# Patient Record
Sex: FEMALE | Race: OTHER | HISPANIC OR LATINO | URBAN - METROPOLITAN AREA
[De-identification: names, ages, dates, MRNs, and addresses within clinical notes are randomized per-mention and may not be internally consistent; named-entity substitution may affect disease eponyms.]

---

## 2017-10-27 ENCOUNTER — INPATIENT (INPATIENT)
Facility: HOSPITAL | Age: 77
LOS: 2 days | Discharge: HOME | DRG: 311 | End: 2017-10-30
Attending: INTERNAL MEDICINE | Admitting: INTERNAL MEDICINE
Payer: COMMERCIAL

## 2017-10-27 VITALS
DIASTOLIC BLOOD PRESSURE: 77 MMHG | SYSTOLIC BLOOD PRESSURE: 166 MMHG | OXYGEN SATURATION: 97 % | HEART RATE: 69 BPM | WEIGHT: 173.06 LBS | HEIGHT: 60 IN | RESPIRATION RATE: 18 BRPM | TEMPERATURE: 98 F

## 2017-10-27 DIAGNOSIS — I24.9 ACUTE ISCHEMIC HEART DISEASE, UNSPECIFIED: ICD-10-CM

## 2017-10-27 LAB
ALBUMIN SERPL ELPH-MCNC: 3.8 G/DL — SIGNIFICANT CHANGE UP (ref 3.5–5)
ALP SERPL-CCNC: 80 U/L — SIGNIFICANT CHANGE UP (ref 40–120)
ALT FLD-CCNC: 24 U/L DA — SIGNIFICANT CHANGE UP (ref 10–60)
ANION GAP SERPL CALC-SCNC: 5 MMOL/L — SIGNIFICANT CHANGE UP (ref 5–17)
AST SERPL-CCNC: 16 U/L — SIGNIFICANT CHANGE UP (ref 10–40)
BASOPHILS # BLD AUTO: 0.1 K/UL — SIGNIFICANT CHANGE UP (ref 0–0.2)
BASOPHILS NFR BLD AUTO: 1.1 % — SIGNIFICANT CHANGE UP (ref 0–2)
BILIRUB SERPL-MCNC: 0.8 MG/DL — SIGNIFICANT CHANGE UP (ref 0.2–1.2)
BUN SERPL-MCNC: 16 MG/DL — SIGNIFICANT CHANGE UP (ref 7–18)
CALCIUM SERPL-MCNC: 9 MG/DL — SIGNIFICANT CHANGE UP (ref 8.4–10.5)
CHLORIDE SERPL-SCNC: 105 MMOL/L — SIGNIFICANT CHANGE UP (ref 96–108)
CK SERPL-CCNC: 101 U/L — SIGNIFICANT CHANGE UP (ref 21–215)
CO2 SERPL-SCNC: 28 MMOL/L — SIGNIFICANT CHANGE UP (ref 22–31)
CREAT SERPL-MCNC: 0.59 MG/DL — SIGNIFICANT CHANGE UP (ref 0.5–1.3)
EOSINOPHIL # BLD AUTO: 0.1 K/UL — SIGNIFICANT CHANGE UP (ref 0–0.5)
EOSINOPHIL NFR BLD AUTO: 1.6 % — SIGNIFICANT CHANGE UP (ref 0–6)
GLUCOSE SERPL-MCNC: 99 MG/DL — SIGNIFICANT CHANGE UP (ref 70–99)
HCT VFR BLD CALC: 42.6 % — SIGNIFICANT CHANGE UP (ref 34.5–45)
HGB BLD-MCNC: 14.5 G/DL — SIGNIFICANT CHANGE UP (ref 11.5–15.5)
LYMPHOCYTES # BLD AUTO: 2.8 K/UL — SIGNIFICANT CHANGE UP (ref 1–3.3)
LYMPHOCYTES # BLD AUTO: 35.1 % — SIGNIFICANT CHANGE UP (ref 13–44)
MCHC RBC-ENTMCNC: 30.6 PG — SIGNIFICANT CHANGE UP (ref 27–34)
MCHC RBC-ENTMCNC: 34 GM/DL — SIGNIFICANT CHANGE UP (ref 32–36)
MCV RBC AUTO: 90 FL — SIGNIFICANT CHANGE UP (ref 80–100)
MONOCYTES # BLD AUTO: 0.6 K/UL — SIGNIFICANT CHANGE UP (ref 0–0.9)
MONOCYTES NFR BLD AUTO: 7.1 % — SIGNIFICANT CHANGE UP (ref 2–14)
NEUTROPHILS # BLD AUTO: 4.4 K/UL — SIGNIFICANT CHANGE UP (ref 1.8–7.4)
NEUTROPHILS NFR BLD AUTO: 55.1 % — SIGNIFICANT CHANGE UP (ref 43–77)
PLATELET # BLD AUTO: 225 K/UL — SIGNIFICANT CHANGE UP (ref 150–400)
POTASSIUM SERPL-MCNC: 4.3 MMOL/L — SIGNIFICANT CHANGE UP (ref 3.5–5.3)
POTASSIUM SERPL-SCNC: 4.3 MMOL/L — SIGNIFICANT CHANGE UP (ref 3.5–5.3)
PROT SERPL-MCNC: 7.9 G/DL — SIGNIFICANT CHANGE UP (ref 6–8.3)
RBC # BLD: 4.74 M/UL — SIGNIFICANT CHANGE UP (ref 3.8–5.2)
RBC # FLD: 12.3 % — SIGNIFICANT CHANGE UP (ref 10.3–14.5)
SODIUM SERPL-SCNC: 138 MMOL/L — SIGNIFICANT CHANGE UP (ref 135–145)
TROPONIN I SERPL-MCNC: <0.015 NG/ML — SIGNIFICANT CHANGE UP (ref 0–0.04)
WBC # BLD: 8 K/UL — SIGNIFICANT CHANGE UP (ref 3.8–10.5)
WBC # FLD AUTO: 8 K/UL — SIGNIFICANT CHANGE UP (ref 3.8–10.5)

## 2017-10-27 PROCEDURE — 71275 CT ANGIOGRAPHY CHEST: CPT | Mod: 26

## 2017-10-27 PROCEDURE — 74174 CTA ABD&PLVS W/CONTRAST: CPT | Mod: 26

## 2017-10-27 PROCEDURE — 99285 EMERGENCY DEPT VISIT HI MDM: CPT

## 2017-10-27 RX ORDER — ASPIRIN/CALCIUM CARB/MAGNESIUM 324 MG
325 TABLET ORAL ONCE
Qty: 0 | Refills: 0 | Status: COMPLETED | OUTPATIENT
Start: 2017-10-27 | End: 2017-10-28

## 2017-10-27 RX ORDER — NITROGLYCERIN 6.5 MG
1 CAPSULE, EXTENDED RELEASE ORAL ONCE
Qty: 0 | Refills: 0 | Status: COMPLETED | OUTPATIENT
Start: 2017-10-27 | End: 2017-10-28

## 2017-10-27 NOTE — ED ADULT NURSE NOTE - ED STAT RN HANDOFF DETAILS 2
pt.remained  stable.. denies  pain. on  tele box O with NSR.denies  chest pain. transfer to   [address] report  given  to kan aldana.not  in  distress

## 2017-10-27 NOTE — ED PROVIDER NOTE - MEDICAL DECISION MAKING DETAILS
pt comes in with back pain and chest pain and shoulder pain intermittent   no n/v/d no cough referred for eval ekg lateral flipped t waves

## 2017-10-27 NOTE — ED PROVIDER NOTE - OBJECTIVE STATEMENT
77 y/o F pt with PMHx of HTN and no significant PSHx presents to ED c/o intermittent back pain radiating to b/l arms and neck x2 weeks. Pt was referred for admission by PMD after obtaining an abnormal EKG today. Pt denies fever, chills, or any other complaints in ED. NKDA.

## 2017-10-28 DIAGNOSIS — Z29.9 ENCOUNTER FOR PROPHYLACTIC MEASURES, UNSPECIFIED: ICD-10-CM

## 2017-10-28 DIAGNOSIS — K21.9 GASTRO-ESOPHAGEAL REFLUX DISEASE WITHOUT ESOPHAGITIS: ICD-10-CM

## 2017-10-28 DIAGNOSIS — E03.9 HYPOTHYROIDISM, UNSPECIFIED: ICD-10-CM

## 2017-10-28 DIAGNOSIS — I24.9 ACUTE ISCHEMIC HEART DISEASE, UNSPECIFIED: ICD-10-CM

## 2017-10-28 DIAGNOSIS — E78.5 HYPERLIPIDEMIA, UNSPECIFIED: ICD-10-CM

## 2017-10-28 LAB
ALBUMIN SERPL ELPH-MCNC: 3.5 G/DL — SIGNIFICANT CHANGE UP (ref 3.5–5)
ALP SERPL-CCNC: 73 U/L — SIGNIFICANT CHANGE UP (ref 40–120)
ALT FLD-CCNC: 21 U/L DA — SIGNIFICANT CHANGE UP (ref 10–60)
ANION GAP SERPL CALC-SCNC: 5 MMOL/L — SIGNIFICANT CHANGE UP (ref 5–17)
AST SERPL-CCNC: 14 U/L — SIGNIFICANT CHANGE UP (ref 10–40)
BILIRUB DIRECT SERPL-MCNC: 0.2 MG/DL — SIGNIFICANT CHANGE UP (ref 0–0.2)
BILIRUB INDIRECT FLD-MCNC: 0.9 MG/DL — SIGNIFICANT CHANGE UP (ref 0.2–1)
BILIRUB SERPL-MCNC: 1.1 MG/DL — SIGNIFICANT CHANGE UP (ref 0.2–1.2)
BUN SERPL-MCNC: 13 MG/DL — SIGNIFICANT CHANGE UP (ref 7–18)
CALCIUM SERPL-MCNC: 8.7 MG/DL — SIGNIFICANT CHANGE UP (ref 8.4–10.5)
CHLORIDE SERPL-SCNC: 105 MMOL/L — SIGNIFICANT CHANGE UP (ref 96–108)
CHOLEST SERPL-MCNC: 243 MG/DL — HIGH (ref 10–199)
CK MB BLD-MCNC: <1.2 % — SIGNIFICANT CHANGE UP (ref 0–3.5)
CK MB BLD-MCNC: <1.4 % — SIGNIFICANT CHANGE UP (ref 0–3.5)
CK MB CFR SERPL CALC: <1 NG/ML — SIGNIFICANT CHANGE UP (ref 0–3.6)
CK MB CFR SERPL CALC: <1 NG/ML — SIGNIFICANT CHANGE UP (ref 0–3.6)
CK SERPL-CCNC: 74 U/L — SIGNIFICANT CHANGE UP (ref 21–215)
CK SERPL-CCNC: 81 U/L — SIGNIFICANT CHANGE UP (ref 21–215)
CO2 SERPL-SCNC: 28 MMOL/L — SIGNIFICANT CHANGE UP (ref 22–31)
CREAT SERPL-MCNC: 0.51 MG/DL — SIGNIFICANT CHANGE UP (ref 0.5–1.3)
FOLATE SERPL-MCNC: >20 NG/ML — SIGNIFICANT CHANGE UP (ref 4.8–24.2)
GLUCOSE SERPL-MCNC: 95 MG/DL — SIGNIFICANT CHANGE UP (ref 70–99)
HBA1C BLD-MCNC: 5.5 % — SIGNIFICANT CHANGE UP (ref 4–5.6)
HCT VFR BLD CALC: 41.5 % — SIGNIFICANT CHANGE UP (ref 34.5–45)
HDLC SERPL-MCNC: 59 MG/DL — SIGNIFICANT CHANGE UP (ref 40–125)
HGB BLD-MCNC: 13.8 G/DL — SIGNIFICANT CHANGE UP (ref 11.5–15.5)
LIPID PNL WITH DIRECT LDL SERPL: 154 MG/DL — SIGNIFICANT CHANGE UP
MAGNESIUM SERPL-MCNC: 2.3 MG/DL — SIGNIFICANT CHANGE UP (ref 1.6–2.6)
MCHC RBC-ENTMCNC: 30.4 PG — SIGNIFICANT CHANGE UP (ref 27–34)
MCHC RBC-ENTMCNC: 33.4 GM/DL — SIGNIFICANT CHANGE UP (ref 32–36)
MCV RBC AUTO: 91 FL — SIGNIFICANT CHANGE UP (ref 80–100)
NT-PROBNP SERPL-SCNC: 196 PG/ML — SIGNIFICANT CHANGE UP (ref 0–450)
PHOSPHATE SERPL-MCNC: 3.4 MG/DL — SIGNIFICANT CHANGE UP (ref 2.5–4.5)
PLATELET # BLD AUTO: 201 K/UL — SIGNIFICANT CHANGE UP (ref 150–400)
POTASSIUM SERPL-MCNC: 3.9 MMOL/L — SIGNIFICANT CHANGE UP (ref 3.5–5.3)
POTASSIUM SERPL-SCNC: 3.9 MMOL/L — SIGNIFICANT CHANGE UP (ref 3.5–5.3)
PROT SERPL-MCNC: 7.3 G/DL — SIGNIFICANT CHANGE UP (ref 6–8.3)
RBC # BLD: 4.56 M/UL — SIGNIFICANT CHANGE UP (ref 3.8–5.2)
RBC # FLD: 12.5 % — SIGNIFICANT CHANGE UP (ref 10.3–14.5)
SODIUM SERPL-SCNC: 138 MMOL/L — SIGNIFICANT CHANGE UP (ref 135–145)
TOTAL CHOLESTEROL/HDL RATIO MEASUREMENT: 4.1 RATIO — SIGNIFICANT CHANGE UP (ref 3.3–7.1)
TRIGL SERPL-MCNC: 151 MG/DL — HIGH (ref 10–149)
TROPONIN I SERPL-MCNC: <0.015 NG/ML — SIGNIFICANT CHANGE UP (ref 0–0.04)
TROPONIN I SERPL-MCNC: <0.015 NG/ML — SIGNIFICANT CHANGE UP (ref 0–0.04)
TSH SERPL-MCNC: 14.9 UU/ML — HIGH (ref 0.34–4.82)
VIT B12 SERPL-MCNC: 357 PG/ML — SIGNIFICANT CHANGE UP (ref 243–894)
WBC # BLD: 6.1 K/UL — SIGNIFICANT CHANGE UP (ref 3.8–10.5)
WBC # FLD AUTO: 6.1 K/UL — SIGNIFICANT CHANGE UP (ref 3.8–10.5)

## 2017-10-28 RX ORDER — PANTOPRAZOLE SODIUM 20 MG/1
40 TABLET, DELAYED RELEASE ORAL
Qty: 0 | Refills: 0 | Status: DISCONTINUED | OUTPATIENT
Start: 2017-10-28 | End: 2017-10-30

## 2017-10-28 RX ORDER — ACETAMINOPHEN 500 MG
650 TABLET ORAL EVERY 6 HOURS
Qty: 0 | Refills: 0 | Status: DISCONTINUED | OUTPATIENT
Start: 2017-10-28 | End: 2017-10-30

## 2017-10-28 RX ORDER — ASPIRIN/CALCIUM CARB/MAGNESIUM 324 MG
81 TABLET ORAL DAILY
Qty: 0 | Refills: 0 | Status: DISCONTINUED | OUTPATIENT
Start: 2017-10-28 | End: 2017-10-30

## 2017-10-28 RX ORDER — METOPROLOL TARTRATE 50 MG
12.5 TABLET ORAL
Qty: 0 | Refills: 0 | Status: DISCONTINUED | OUTPATIENT
Start: 2017-10-28 | End: 2017-10-30

## 2017-10-28 RX ORDER — ENOXAPARIN SODIUM 100 MG/ML
80 INJECTION SUBCUTANEOUS ONCE
Qty: 0 | Refills: 0 | Status: COMPLETED | OUTPATIENT
Start: 2017-10-28 | End: 2017-10-28

## 2017-10-28 RX ORDER — NITROGLYCERIN 6.5 MG
0.3 CAPSULE, EXTENDED RELEASE ORAL
Qty: 0 | Refills: 0 | Status: DISCONTINUED | OUTPATIENT
Start: 2017-10-28 | End: 2017-10-30

## 2017-10-28 RX ORDER — INFLUENZA VIRUS VACCINE 15; 15; 15; 15 UG/.5ML; UG/.5ML; UG/.5ML; UG/.5ML
0.5 SUSPENSION INTRAMUSCULAR ONCE
Qty: 0 | Refills: 0 | Status: COMPLETED | OUTPATIENT
Start: 2017-10-28 | End: 2017-10-28

## 2017-10-28 RX ORDER — LEVOTHYROXINE SODIUM 125 MCG
75 TABLET ORAL DAILY
Qty: 0 | Refills: 0 | Status: DISCONTINUED | OUTPATIENT
Start: 2017-10-28 | End: 2017-10-30

## 2017-10-28 RX ORDER — ATORVASTATIN CALCIUM 80 MG/1
40 TABLET, FILM COATED ORAL AT BEDTIME
Qty: 0 | Refills: 0 | Status: DISCONTINUED | OUTPATIENT
Start: 2017-10-28 | End: 2017-10-30

## 2017-10-28 RX ADMIN — ENOXAPARIN SODIUM 80 MILLIGRAM(S): 100 INJECTION SUBCUTANEOUS at 03:30

## 2017-10-28 RX ADMIN — PANTOPRAZOLE SODIUM 40 MILLIGRAM(S): 20 TABLET, DELAYED RELEASE ORAL at 06:30

## 2017-10-28 RX ADMIN — Medication 1 INCH(S): at 00:37

## 2017-10-28 RX ADMIN — Medication 325 MILLIGRAM(S): at 00:37

## 2017-10-28 RX ADMIN — Medication 650 MILLIGRAM(S): at 12:38

## 2017-10-28 RX ADMIN — Medication 81 MILLIGRAM(S): at 12:38

## 2017-10-28 RX ADMIN — Medication 1 INCH(S): at 12:37

## 2017-10-28 RX ADMIN — Medication 75 MICROGRAM(S): at 06:30

## 2017-10-28 RX ADMIN — ATORVASTATIN CALCIUM 40 MILLIGRAM(S): 80 TABLET, FILM COATED ORAL at 00:36

## 2017-10-28 RX ADMIN — Medication 650 MILLIGRAM(S): at 13:30

## 2017-10-28 RX ADMIN — Medication 12.5 MILLIGRAM(S): at 06:30

## 2017-10-28 RX ADMIN — ATORVASTATIN CALCIUM 40 MILLIGRAM(S): 80 TABLET, FILM COATED ORAL at 22:54

## 2017-10-28 NOTE — CONSULT NOTE ADULT - SUBJECTIVE AND OBJECTIVE BOX
Requesting Physician : Dr. Gallegos     Reason for Consultation: Chest pain     HISTORY OF PRESENT ILLNESS:  77 yo F with history of HLD, GERD, hypothyroidism who is being evaluated for chest pain.  The patient was admitted with intermittent chest pain for two weeks.  The patient reports chest pain radiating to back and occasionally the left shoulder that occurs intermittently and at rest.  The patient denies exertional component to symptoms but does reports occasional fatigue with walking.  She reports cough and URI symptoms during this time as well.  No orthopnea, le edema, pnd, syncope or any other cardiac complaints.  Upon evaluation, the patient was chest pain free.       PAST MEDICAL & SURGICAL HISTORY: HTN, HLD, GERD           MEDICATIONS:  MEDICATIONS  (STANDING):  aspirin enteric coated 81 milliGRAM(s) Oral daily  atorvastatin 40 milliGRAM(s) Oral at bedtime  influenza   Vaccine 0.5 milliLiter(s) IntraMuscular once  levothyroxine 75 MICROGram(s) Oral daily  metoprolol     tartrate 12.5 milliGRAM(s) Oral two times a day  pantoprazole    Tablet 40 milliGRAM(s) Oral before breakfast      Allergies    No Known Allergies    Intolerances        FAMILY HISTORY:    Non-contributary for premature coronary disease or sudden cardiac death    SOCIAL HISTORY:    [x ] Non-smoker  [ ] Smoker  [ ] Alcohol      REVIEW OF SYSTEMS:  [x ]chest pain  [  ]shortness of breath  [  ]palpitations  [  ]syncope  [ ]near syncope [ ]upper extremity weakness   [ ] lower extremity weakness  [  ]diplopia  [  ]altered mental status   [  ]fevers  [ ]chills [ ]nausea  [ ]vomitting  [  ]dysphagia    [ ]abdominal pain  [ ]melena  [ ]BRBPR    [  ]epistaxis  [  ]rash    [ ]lower extremity edema        [x ] All others negative	  [ ] Unable to obtain    PHYSICAL EXAM:  T(C): 36.6 (10-28-17 @ 08:01), Max: 36.9 (10-27-17 @ 23:33)  HR: 54 (10-28-17 @ 08:01) (54 - 69)  BP: 100/48 (10-28-17 @ 08:01) (100/48 - 166/77)  RR: 18 (10-28-17 @ 08:01) (18 - 20)  SpO2: 95% (10-28-17 @ 08:01) (95% - 97%)  Wt(kg): --  I&O's Summary          Lymphatic: No lymphadenopathy , no edema  Cardiovascular: Normal S1 S2, No JVD, No murmurs , Peripheral pulses palpable 2+ bilaterally  Respiratory: Lungs clear to auscultation, normal effort 	  Gastrointestinal:  Soft, Non-tender, + BS	  Skin: No rashes, No ecchymoses, No cyanosis, warm to touch  Musculoskeletal: Normal range of motion, normal strength  Psychiatry:  Mood & affect appropriate      TELEMETRY: SR	    ECG: SR, non-specific T wave abnormalities  	  RADIOLOGY:  OTHER:     DIAGNOSTIC TESTING:  [ ] Echocardiogram:  [ ]  Catheterization:  [ ] Stress Test:    	  	  LABS:	 	    CARDIAC MARKERS:  CARDIAC MARKERS ( 28 Oct 2017 04:41 )  <0.015 ng/mL / x     / 81 U/L / x     / <1.0 ng/mL  CARDIAC MARKERS ( 27 Oct 2017 21:06 )  <0.015 ng/mL / x     / 101 U/L / x     / x                                  13.8   6.1   )-----------( 201      ( 28 Oct 2017 04:41 )             41.5     10-28    138  |  105  |  13  ----------------------------<  95  3.9   |  28  |  0.51    Ca    8.7      28 Oct 2017 04:41  Phos  3.4     10-28  Mg     2.3     10-28    TPro  7.3  /  Alb  3.5  /  TBili  1.1  /  DBili  0.2  /  AST  14  /  ALT  21  /  AlkPhos  73  10-28    proBNP: Serum Pro-Brain Natriuretic Peptide: 196 pg/mL (10-27 @ 21:06)    Lipid Profile:   HgA1c: Hemoglobin A1C, Whole Blood: 5.5 % (10-28 @ 09:18)    TSH: Thyroid Stimulating Hormone, Serum: 14.90 uU/mL (10-28 @ 04:41)      ASSESSMENT/PLAN: 	76yFemale with HLD, GERD, hypothyroidism admitted with chest pain.   -MI ruled out  -CT negative for dissection  -Check TTE  -Check NST  -further workup pending above    Annie Caldera MD  [facility]  [address], [id number]  [place] [address]  office: [phone]  pager: [phone]

## 2017-10-28 NOTE — H&P ADULT - NSHPPHYSICALEXAM_GEN_ALL_CORE
GENERAL: middle aged lady in no acute distress  HEAD: atraumatic, normocephalic   EYES: PERRLA, white sclera.   ENMT: nasal mucosa- Oral cavity- moist  NECK: supple, no JVD, thyroid- not palpable  LYMPH: no palpable lymph nodes     SKIN:  warm and dry  CHEST/LUNG: No Chest deformity , no chest tenderness. bilateral breath sounds,no  adventitious sounds  HEART: RRR, no m/r/g   ABDOMEN: soft, nontender, nondistended; bowel sounds+  EXTREMITIES: mild b/l Ankle puffiness, no cyanosis, no clubbing.  NEURO: AA0X3     No ROM limitation of the left arm  Back pain on deep palpation in the left upper-middle back

## 2017-10-28 NOTE — H&P ADULT - HISTORY OF PRESENT ILLNESS
Patient is a 75 y/o F pt from home, with PMHx of HLD, GERD and hypothyroidism,  and no significant PSHx presents to ED c/o intermittent back pain radiating to left arm and neck for 2 weeks.  patient describes the Back pain as achy, no recent unusual exercise or back trauma. intermittent, non exertional and radiates to the left arm. No CP, but feels SOB on climbing stairs. denies orthopnea but states that she has been sleeping in sitting position in night for the past week or so, no peripheral edema, No PND. Also admits to cough productive of white sputum only since today. ROS negative except above. Pt was referred for admission by PMD after obtaining an abnormal EKG today.  NKDA. Used to smoker 30 years ago, never drinker, FHx of CAD in mother, who dies of MI and in both brothers. No Surgical Hx

## 2017-10-28 NOTE — H&P ADULT - PROBLEM SELECTOR PLAN 1
Patient with atypical presentation, but has T W I in lateral leads, currently chest pain free and females may have an atypical presentation  TWI in Lateral leads in EKG  MAX score:1  will manage as per ACS protocol, ASA, Statin, BB, TELE, trend Troponins, ECHO  Will give 1 full dose lovenox and get cardio Eval Dr Cárdenas Patient with atypical presentation, but has T W I in lateral leads, currently chest pain free and females may have an atypical presentation  TWI in Lateral leads in EKG  MAX score:1  will manage as per ACS protocol, ASA, Statin, BB, TELE, trend Troponins, ECHO  Will give 1 full dose lovenox and get cardio Eval Dr Lares

## 2017-10-28 NOTE — PROGRESS NOTE ADULT - SUBJECTIVE AND OBJECTIVE BOX
Patient is a 76y old  Female who presents with a chief complaint of Back pain (28 Oct 2017 00:49)    pt seen in icu [  ], reg med floor [   ], bed [  ], chair at bedside [   ], a+o x3 [  ], lethargic [  ],  nad [  ]    vann [  ], ngt [  ], peg [  ], et tube [  ], cent line [  ], picc line [  ]        Allergies    No Known Allergies        Vitals    T(F): 97.9 (10-28-17 @ 08:01), Max: 98.4 (10-27-17 @ 23:33)  HR: 54 (10-28-17 @ 08:01) (54 - 69)  BP: 100/48 (10-28-17 @ 08:01) (100/48 - 166/77)  RR: 18 (10-28-17 @ 08:01) (18 - 20)  SpO2: 95% (10-28-17 @ 08:01) (95% - 97%)  Wt(kg): --  CAPILLARY BLOOD GLUCOSE          Labs                          13.8   6.1   )-----------( 201      ( 28 Oct 2017 04:41 )             41.5       10-28    138  |  105  |  13  ----------------------------<  95  3.9   |  28  |  0.51    Ca    8.7      28 Oct 2017 04:41  Phos  3.4     10-28  Mg     2.3     10-28    TPro  7.3  /  Alb  3.5  /  TBili  1.1  /  DBili  0.2  /  AST  14  /  ALT  21  /  AlkPhos  73  10-28      CARDIAC MARKERS ( 28 Oct 2017 04:41 )  <0.015 ng/mL / x     / 81 U/L / x     / <1.0 ng/mL  CARDIAC MARKERS ( 27 Oct 2017 21:06 )  <0.015 ng/mL / x     / 101 U/L / x     / x                Radiology Results      Meds    MEDICATIONS  (STANDING):  aspirin enteric coated 81 milliGRAM(s) Oral daily  atorvastatin 40 milliGRAM(s) Oral at bedtime  influenza   Vaccine 0.5 milliLiter(s) IntraMuscular once  levothyroxine 75 MICROGram(s) Oral daily  metoprolol     tartrate 12.5 milliGRAM(s) Oral two times a day  pantoprazole    Tablet 40 milliGRAM(s) Oral before breakfast      MEDICATIONS  (PRN):  guaiFENesin    Syrup 100 milliGRAM(s) Oral every 6 hours PRN Cough  nitroglycerin     SubLingual 0.3 milliGRAM(s) SubLingual every 5 minutes PRN Chest Pain      Physical Exam    Neuro :  no focal deficits  Respiratory: CTA B/L  CV: RRR, S1S2, no murmurs,   Abdominal: Soft, NT, ND +BS,  Extremities: No edema, + peripheral pulses    ASSESSMENT    Acute ischemic heart disease      PLAN Patient is a 77 y/o F pt from home, with PMHx of HLD, GERD and hypothyroidism,  and no significant PSHx presents to ED c/o intermittent back pain radiating to left arm and neck for 2 weeks.  patient describes the Back pain as achy, no recent unusual exercise or back trauma. intermittent, non exertional and radiates to the left arm. No CP, but feels SOB on climbing stairs. denies orthopnea but states that she has been sleeping in sitting position in night for the past week or so, no peripheral edema, No PND. Also admits to cough productive of white sputum only since today. ROS negative except above. Pt was referred for admission by PMD after obtaining an abnormal EKG today.  NKDA. Used to smoker 30 years ago, never drinker, FHx of CAD in mother, who dies of MI and in both brothers. No Surgical Hx    Review of Systems:  Other Review of Systems: All other review of systems negative, except as noted in HPI	      pt seen in tele [x  ], reg med floor [   ], bed [ x ], chair at bedside [   ], a+o x3 [ x ], lethargic [  ],  nad [ x ]      Allergies    No Known Allergies        Vitals    T(F): 97.9 (10-28-17 @ 08:01), Max: 98.4 (10-27-17 @ 23:33)  HR: 54 (10-28-17 @ 08:01) (54 - 69)  BP: 100/48 (10-28-17 @ 08:01) (100/48 - 166/77)  RR: 18 (10-28-17 @ 08:01) (18 - 20)  SpO2: 95% (10-28-17 @ 08:01) (95% - 97%)  Wt(kg): --  CAPILLARY BLOOD GLUCOSE          Labs                          13.8   6.1   )-----------( 201      ( 28 Oct 2017 04:41 )             41.5       10-28    138  |  105  |  13  ----------------------------<  95  3.9   |  28  |  0.51    Ca    8.7      28 Oct 2017 04:41  Phos  3.4     10-28  Mg     2.3     10-28    TPro  7.3  /  Alb  3.5  /  TBili  1.1  /  DBili  0.2  /  AST  14  /  ALT  21  /  AlkPhos  73  10-28      CARDIAC MARKERS ( 28 Oct 2017 04:41 )  <0.015 ng/mL / x     / 81 U/L / x     / <1.0 ng/mL  CARDIAC MARKERS ( 27 Oct 2017 21:06 )  <0.015 ng/mL / x     / 101 U/L / x     / x                Radiology Results    < from: CT Angio Chest w/ IV Cont (10.27.17 @ 22:43) >  FINDINGS:  There is motion artifact present which degrades image quality.    The aorta is normal in caliber without evidence of dissection or   aneurysm. There is no contrast extravasation. No intramural hematoma is   present on the precontrast images. The great vessels off the aortic arch   are patent. The celiac, superior mesenteric, inferior mesenteric and both   renal arteries are patent. Both common iliac arteries are patent and   normal in caliber.    There is no evidence of a pulmonary embolism.    No significant mediastinal, axillary or hilar lymphadenopathy is   identified.    Evaluation of the lung parenchyma demonstrates no suspicious pulmonary   nodule or mass. There is no pneumonia. Small cyst is seen in the lingula   of the left upper lobe. There is no pleural effusion or pneumothorax. The   trachea and main bronchi are clear.    The heart size is within normal limits. No pericardial effusion is seen.    The liver, gallbladder, spleen, pancreas, adrenal glands and kidneys   demonstrate no abnormality for phase of enhancement.    There is no bowel obstruction. There is diverticulosis without   diverticulitis. A normal appendix is seen.    Mildly enlarged likely fibroid uterus. No adnexal masses. No urinary   bladder wall thickening. There is mild grade 1 anterior listhesis of L4   on L5, likely on a degenerative basis. There are no acute osseous   abnormalities.    There is a small asymmetric 8mm right breast nodule.    IMPRESSION: No evidence of aortic dissection or aneurysm. Patent   mesenteric vasculature.    8mm asymmetric right breast nodule. Correlation with mammography is   recommended.    < end of copied text >        Meds    MEDICATIONS  (STANDING):  aspirin enteric coated 81 milliGRAM(s) Oral daily  atorvastatin 40 milliGRAM(s) Oral at bedtime  influenza   Vaccine 0.5 milliLiter(s) IntraMuscular once  levothyroxine 75 MICROGram(s) Oral daily  metoprolol     tartrate 12.5 milliGRAM(s) Oral two times a day  pantoprazole    Tablet 40 milliGRAM(s) Oral before breakfast      MEDICATIONS  (PRN):  guaiFENesin    Syrup 100 milliGRAM(s) Oral every 6 hours PRN Cough  nitroglycerin     SubLingual 0.3 milliGRAM(s) SubLingual every 5 minutes PRN Chest Pain      Physical Exam    Neuro :  no focal deficits  Respiratory: CTA B/L  CV: RRR, S1S2, no murmurs,   Abdominal: Soft, NT, ND +BS,  Extremities: No edema, + peripheral pulses    ASSESSMENT    chest pain  r/o Acute ischemic heart disease  right breast nodule  generalized back and shoulder pain  h/o HLD,   GERD   hypothyroidism    PLAN    adm to tele,   acs protocol,   ce q8 x2 neg  f/u echo  cardio cons  cta with breast nodule but neg for aortic aneurysm or pe noted above  surg eval  f/u diagnostic miguel right breast  f/u esr, gui, ck, rf  cont pain control  pain mgmt eval  cont current meds

## 2017-10-28 NOTE — H&P ADULT - ASSESSMENT
Patient is a 77 y/o F pt from home, with PMHx of HLD, GERD and hypothyroidism,  and no significant PSHx presents to ED c/o intermittent back pain radiating to left arm and neck for 2 weeks.     Patient has stable labs and vitals in the ED, s/p 1 Full dos of ASA. Got CTA Chest and Abdomen in the ED, showed no evidence of dissection or aneurysm. 8mm right breast nodule, recommended Mammogram but patient says she had mammogram 1 year ago that was WNL.

## 2017-10-29 LAB
ANION GAP SERPL CALC-SCNC: 6 MMOL/L — SIGNIFICANT CHANGE UP (ref 5–17)
BUN SERPL-MCNC: 13 MG/DL — SIGNIFICANT CHANGE UP (ref 7–18)
CALCIUM SERPL-MCNC: 8.8 MG/DL — SIGNIFICANT CHANGE UP (ref 8.4–10.5)
CHLORIDE SERPL-SCNC: 105 MMOL/L — SIGNIFICANT CHANGE UP (ref 96–108)
CO2 SERPL-SCNC: 28 MMOL/L — SIGNIFICANT CHANGE UP (ref 22–31)
CREAT SERPL-MCNC: 0.5 MG/DL — SIGNIFICANT CHANGE UP (ref 0.5–1.3)
GLUCOSE SERPL-MCNC: 95 MG/DL — SIGNIFICANT CHANGE UP (ref 70–99)
HCT VFR BLD CALC: 44 % — SIGNIFICANT CHANGE UP (ref 34.5–45)
HGB BLD-MCNC: 14.5 G/DL — SIGNIFICANT CHANGE UP (ref 11.5–15.5)
MAGNESIUM SERPL-MCNC: 2.4 MG/DL — SIGNIFICANT CHANGE UP (ref 1.6–2.6)
MCHC RBC-ENTMCNC: 30.2 PG — SIGNIFICANT CHANGE UP (ref 27–34)
MCHC RBC-ENTMCNC: 33 GM/DL — SIGNIFICANT CHANGE UP (ref 32–36)
MCV RBC AUTO: 91.7 FL — SIGNIFICANT CHANGE UP (ref 80–100)
PHOSPHATE SERPL-MCNC: 3.2 MG/DL — SIGNIFICANT CHANGE UP (ref 2.5–4.5)
PLATELET # BLD AUTO: 196 K/UL — SIGNIFICANT CHANGE UP (ref 150–400)
POTASSIUM SERPL-MCNC: 4 MMOL/L — SIGNIFICANT CHANGE UP (ref 3.5–5.3)
POTASSIUM SERPL-SCNC: 4 MMOL/L — SIGNIFICANT CHANGE UP (ref 3.5–5.3)
RBC # BLD: 4.8 M/UL — SIGNIFICANT CHANGE UP (ref 3.8–5.2)
RBC # FLD: 12.6 % — SIGNIFICANT CHANGE UP (ref 10.3–14.5)
SODIUM SERPL-SCNC: 139 MMOL/L — SIGNIFICANT CHANGE UP (ref 135–145)
T4 FREE SERPL-MCNC: 1.2 NG/DL — SIGNIFICANT CHANGE UP (ref 0.9–1.8)
WBC # BLD: 6 K/UL — SIGNIFICANT CHANGE UP (ref 3.8–10.5)
WBC # FLD AUTO: 6 K/UL — SIGNIFICANT CHANGE UP (ref 3.8–10.5)

## 2017-10-29 RX ADMIN — Medication 81 MILLIGRAM(S): at 11:56

## 2017-10-29 RX ADMIN — PANTOPRAZOLE SODIUM 40 MILLIGRAM(S): 20 TABLET, DELAYED RELEASE ORAL at 06:50

## 2017-10-29 RX ADMIN — Medication 75 MICROGRAM(S): at 06:50

## 2017-10-29 RX ADMIN — ATORVASTATIN CALCIUM 40 MILLIGRAM(S): 80 TABLET, FILM COATED ORAL at 21:17

## 2017-10-29 NOTE — PROGRESS NOTE ADULT - SUBJECTIVE AND OBJECTIVE BOX
PGY 1 Note discussed with supervising resident and primary attending    Patient is a 76y old  Female who presents with a chief complaint of Back pain (28 Oct 2017 00:49)      INTERVAL HPI/OVERNIGHT EVENTS: Patient was seen and examined at bed side. No new complaints.     MEDICATIONS  (STANDING):  aspirin enteric coated 81 milliGRAM(s) Oral daily  atorvastatin 40 milliGRAM(s) Oral at bedtime  influenza   Vaccine 0.5 milliLiter(s) IntraMuscular once  levothyroxine 75 MICROGram(s) Oral daily  metoprolol     tartrate 12.5 milliGRAM(s) Oral two times a day  pantoprazole    Tablet 40 milliGRAM(s) Oral before breakfast    MEDICATIONS  (PRN):  acetaminophen   Tablet. 650 milliGRAM(s) Oral every 6 hours PRN Moderate Pain (4 - 6)  guaiFENesin    Syrup 100 milliGRAM(s) Oral every 6 hours PRN Cough  nitroglycerin     SubLingual 0.3 milliGRAM(s) SubLingual every 5 minutes PRN Chest Pain      __________________________________________________  REVIEW OF SYSTEMS:    CONSTITUTIONAL: No fever,   EYES: no acute visual disturbances  NECK: No pain or stiffness  RESPIRATORY: No cough; No shortness of breath  CARDIOVASCULAR: No chest pain, no palpitations  GASTROINTESTINAL: No pain. No nausea or vomiting; No diarrhea   NEUROLOGICAL: No headache or numbness, no tremors  MUSCULOSKELETAL:  pain of left upper back  GENITOURINARY: no dysuria, no frequency, no hesitancy  PSYCHIATRY: no depression , no anxiety  ALL OTHER  ROS negative        Vital Signs Last 24 Hrs  T(C): 36.7 (29 Oct 2017 05:00), Max: 37 (29 Oct 2017 00:00)  T(F): 98 (29 Oct 2017 05:00), Max: 98.6 (29 Oct 2017 00:00)  HR: 56 (29 Oct 2017 05:00) (55 - 63)  BP: 111/67 (29 Oct 2017 05:00) (101/45 - 123/66)  BP(mean): --  RR: 20 (29 Oct 2017 05:00) (18 - 20)  SpO2: 98% (29 Oct 2017 05:00) (96% - 98%)    ________________________________________________  PHYSICAL EXAM:  GENERAL: NAD  HEENT: Normocephalic;  conjunctivae and sclerae clear; moist mucous membranes;   NECK : supple  CHEST/LUNG: Clear to auscultation bilaterally with good air entry   HEART: S1 S2  regular; no murmurs, gallops or rubs  ABDOMEN: Soft, Nontender, Nondistended; Bowel sounds present  EXTREMITIES: no cyanosis; no edema; no calf tenderness  SKIN: warm and dry; no rash  NERVOUS SYSTEM:  Awake and alert; Oriented  to place, person and time ; no new deficits    _________________________________________________  LABS:                        14.5   6.0   )-----------( 196      ( 29 Oct 2017 07:34 )             44.0     10-29    139  |  105  |  13  ----------------------------<  95  4.0   |  28  |  0.50    Ca    8.8      29 Oct 2017 07:34  Phos  3.2     10-29  Mg     2.4     10-29    TPro  7.3  /  Alb  3.5  /  TBili  1.1  /  DBili  0.2  /  AST  14  /  ALT  21  /  AlkPhos  73  10-28                    RADIOLOGY & ADDITIONAL TESTS:    Imaging Personally Reviewed:  YES    Consultant(s) Notes Reviewed:   YES    Care Discussed with Consultants :     Plan of care was discussed with patient and /or primary care giver; all questions and concerns were addressed and care was aligned with patient's wishes.

## 2017-10-29 NOTE — PROGRESS NOTE ADULT - PROBLEM SELECTOR PLAN 3
c.w synthroid at home dose  TSH 14.9  synthyroid 75 mcg. needs to adjust dose. c.w synthroid at home dose  TSH 14.9  follow up free t4  synthyroid 75 mcg. needs to adjust dose if free t4 low .

## 2017-10-29 NOTE — PROGRESS NOTE ADULT - SUBJECTIVE AND OBJECTIVE BOX
Subjective:  pt seen and examined, no complaints on exam.   no events overnight    acetaminophen   Tablet. 650 milliGRAM(s) Oral every 6 hours PRN  aspirin enteric coated 81 milliGRAM(s) Oral daily  atorvastatin 40 milliGRAM(s) Oral at bedtime  guaiFENesin    Syrup 100 milliGRAM(s) Oral every 6 hours PRN  influenza   Vaccine 0.5 milliLiter(s) IntraMuscular once  levothyroxine 75 MICROGram(s) Oral daily  metoprolol     tartrate 12.5 milliGRAM(s) Oral two times a day  nitroglycerin     SubLingual 0.3 milliGRAM(s) SubLingual every 5 minutes PRN  pantoprazole    Tablet 40 milliGRAM(s) Oral before breakfast                            13.8   6.1   )-----------( 201      ( 28 Oct 2017 04:41 )             41.5       Hemoglobin: 13.8 g/dL (10-28 @ 04:41)  Hemoglobin: 14.5 g/dL (10-27 @ 21:06)      10-28    138  |  105  |  13  ----------------------------<  95  3.9   |  28  |  0.51    Ca    8.7      28 Oct 2017 04:41  Phos  3.4     10-28  Mg     2.3     10-28    TPro  7.3  /  Alb  3.5  /  TBili  1.1  /  DBili  0.2  /  AST  14  /  ALT  21  /  AlkPhos  73  10-28    Creatinine Trend: 0.51<--, 0.59<--    COAGS:     CARDIAC MARKERS ( 28 Oct 2017 12:22 )  <0.015 ng/mL / x     / 74 U/L / x     / <1.0 ng/mL  CARDIAC MARKERS ( 28 Oct 2017 04:41 )  <0.015 ng/mL / x     / 81 U/L / x     / <1.0 ng/mL  CARDIAC MARKERS ( 27 Oct 2017 21:06 )  <0.015 ng/mL / x     / 101 U/L / x     / x            T(C): 36.7 (10-29-17 @ 05:00), Max: 37 (10-29-17 @ 00:00)  HR: 56 (10-29-17 @ 05:00) (54 - 63)  BP: 111/67 (10-29-17 @ 05:00) (100/48 - 123/66)  RR: 20 (10-29-17 @ 05:00) (18 - 20)  SpO2: 98% (10-29-17 @ 05:00) (95% - 98%)  Wt(kg): --    I&O's Summary      Daily     Daily Weight in k.2 (29 Oct 2017 05:00)    HEENT:   Normal oral mucosa, PERRL, EOMI	  Lymphatic: No lymphadenopathy , no edema  Cardiovascular: Normal S1 S2, No JVD, No murmurs , Peripheral pulses palpable 2+ bilaterally  Respiratory: Lungs clear to auscultation, normal effort 	  Gastrointestinal:  Soft, Non-tender, + BS	      TELEMETRY:  NSR 	      [ ] Echocardiogram:   [ ]  Catheterization:  [ ] Stress Test:    OTHER: 	  CT angio : < from: CT Angio Chest w/ IV Cont (10.27.17 @ 22:43) >  IMPRESSION: No evidence of aortic dissection or aneurysm. Patent   mesenteric vasculature.    8mm asymmetric right breast nodule. Correlation with mammography is   recommended.    RED CHANDLER M.D., RADIOLOGIST  This document has been electronically signed. Oct 27 2017 10:50PM    < end of copied text >        ASSESSMENT/PLAN: 	76y Female  with HLD, GERD, hypothyroidism admitted with chest pain.     cardiac markers neg x 3   echo and nst pending   cont asa / statins  bp controlled with low dose BB   GI / DVT prophylaxis.   keep K>4, mag >2.0  further plan post above   no s/s of chf   D/W Dr Lares

## 2017-10-29 NOTE — PROGRESS NOTE ADULT - PROBLEM SELECTOR PLAN 1
Patient with atypical presentation, but has T W I in lateral leads, currently chest pain free and females may have an atypical presentation  TWI in Lateral leads in EKG  MAX score:1  on ASA, Statin, BB, TELE, trend Troponins, ECHO pending, if negative then Stress test  given 1 full dose lovenox   cardio Eval Dr Lares noted  CT chest angio shows 8mm nodule in right breast. follow up diagnostic mammogram.

## 2017-10-29 NOTE — PROGRESS NOTE ADULT - PROBLEM SELECTOR PLAN 2
c/w statin and   Lipid profile WNL on atorvastatin 40 mg daily  Lipid profile cholesterol 243  elevated  slightly elevated  likely secondary to hypothyroidism.

## 2017-10-29 NOTE — PROGRESS NOTE ADULT - SUBJECTIVE AND OBJECTIVE BOX
Patient is a 76y old  Female who presents with a chief complaint of Back pain (28 Oct 2017 00:49)    pt seen in tele [x  ], reg med floor [   ], bed [ x ], chair at bedside [   ], a+o x3 [ x ], lethargic [  ],   nad [ x ]    Allergies    No Known Allergies        Vitals    T(F): 98 (10-29-17 @ 05:00), Max: 98.6 (10-29-17 @ 00:00)  HR: 56 (10-29-17 @ 05:00) (55 - 63)  BP: 111/67 (10-29-17 @ 05:00) (101/45 - 123/66)  RR: 20 (10-29-17 @ 05:00) (18 - 20)  SpO2: 98% (10-29-17 @ 05:00) (96% - 98%)  Wt(kg): --  CAPILLARY BLOOD GLUCOSE          Labs                          13.8   6.1   )-----------( 201      ( 28 Oct 2017 04:41 )             41.5       10-28    138  |  105  |  13  ----------------------------<  95  3.9   |  28  |  0.51    Ca    8.7      28 Oct 2017 04:41  Phos  3.4     10-28  Mg     2.3     10-28    TPro  7.3  /  Alb  3.5  /  TBili  1.1  /  DBili  0.2  /  AST  14  /  ALT  21  /  AlkPhos  73  10-28      CARDIAC MARKERS ( 28 Oct 2017 12:22 )  <0.015 ng/mL / x     / 74 U/L / x     / <1.0 ng/mL  CARDIAC MARKERS ( 28 Oct 2017 04:41 )  <0.015 ng/mL / x     / 81 U/L / x     / <1.0 ng/mL  CARDIAC MARKERS ( 27 Oct 2017 21:06 )  <0.015 ng/mL / x     / 101 U/L / x     / x                Radiology Results      Meds    MEDICATIONS  (STANDING):  aspirin enteric coated 81 milliGRAM(s) Oral daily  atorvastatin 40 milliGRAM(s) Oral at bedtime  influenza   Vaccine 0.5 milliLiter(s) IntraMuscular once  levothyroxine 75 MICROGram(s) Oral daily  metoprolol     tartrate 12.5 milliGRAM(s) Oral two times a day  pantoprazole    Tablet 40 milliGRAM(s) Oral before breakfast      MEDICATIONS  (PRN):  acetaminophen   Tablet. 650 milliGRAM(s) Oral every 6 hours PRN Moderate Pain (4 - 6)  guaiFENesin    Syrup 100 milliGRAM(s) Oral every 6 hours PRN Cough  nitroglycerin     SubLingual 0.3 milliGRAM(s) SubLingual every 5 minutes PRN Chest Pain      Physical Exam    Neuro :  no focal deficits  Respiratory: CTA B/L  CV: RRR, S1S2, no murmurs,   Abdominal: Soft, NT, ND +BS,  Extremities: No edema, + peripheral pulses    ASSESSMENT    chest pain  r/o Acute ischemic heart disease  right breast nodule  generalized back and shoulder pain  h/o HLD,   GERD   hypothyroidism    PLAN    adm to tele,   acs protocol,   ce q8 x2 neg  f/u echo  cardio cons  cta with breast nodule but neg for aortic aneurysm or pe noted above  surg eval  f/u diagnostic miguel right breast  f/u esr, gui, ck, rf  cont pain control  pain mgmt eval  cont current meds Patient is a 76y old  Female who presents with a chief complaint of Back pain (28 Oct 2017 00:49)    pt seen in tele [x  ], reg med floor [   ], bed [ ], chair at bedside [ x  ], a+o x3 [ x ], lethargic [  ],   nad [ x ]    Allergies    No Known Allergies        Vitals    T(F): 98 (10-29-17 @ 05:00), Max: 98.6 (10-29-17 @ 00:00)  HR: 56 (10-29-17 @ 05:00) (55 - 63)  BP: 111/67 (10-29-17 @ 05:00) (101/45 - 123/66)  RR: 20 (10-29-17 @ 05:00) (18 - 20)  SpO2: 98% (10-29-17 @ 05:00) (96% - 98%)  Wt(kg): --  CAPILLARY BLOOD GLUCOSE          Labs                          13.8   6.1   )-----------( 201      ( 28 Oct 2017 04:41 )             41.5       10-28    138  |  105  |  13  ----------------------------<  95  3.9   |  28  |  0.51    Ca    8.7      28 Oct 2017 04:41  Phos  3.4     10-28  Mg     2.3     10-28    TPro  7.3  /  Alb  3.5  /  TBili  1.1  /  DBili  0.2  /  AST  14  /  ALT  21  /  AlkPhos  73  10-28      CARDIAC MARKERS ( 28 Oct 2017 12:22 )  <0.015 ng/mL / x     / 74 U/L / x     / <1.0 ng/mL  CARDIAC MARKERS ( 28 Oct 2017 04:41 )  <0.015 ng/mL / x     / 81 U/L / x     / <1.0 ng/mL  CARDIAC MARKERS ( 27 Oct 2017 21:06 )  <0.015 ng/mL / x     / 101 U/L / x     / x                Radiology Results      Meds    MEDICATIONS  (STANDING):  aspirin enteric coated 81 milliGRAM(s) Oral daily  atorvastatin 40 milliGRAM(s) Oral at bedtime  influenza   Vaccine 0.5 milliLiter(s) IntraMuscular once  levothyroxine 75 MICROGram(s) Oral daily  metoprolol     tartrate 12.5 milliGRAM(s) Oral two times a day  pantoprazole    Tablet 40 milliGRAM(s) Oral before breakfast      MEDICATIONS  (PRN):  acetaminophen   Tablet. 650 milliGRAM(s) Oral every 6 hours PRN Moderate Pain (4 - 6)  guaiFENesin    Syrup 100 milliGRAM(s) Oral every 6 hours PRN Cough  nitroglycerin     SubLingual 0.3 milliGRAM(s) SubLingual every 5 minutes PRN Chest Pain      Physical Exam    Neuro :  no focal deficits  Respiratory: CTA B/L  CV: RRR, S1S2, no murmurs,   Abdominal: Soft, NT, ND +BS,  Extremities: No edema, + peripheral pulses    ASSESSMENT    chest pain  r/o Acute ischemic heart disease  right breast nodule  generalized back and shoulder pain  h/o HLD,   GERD   hypothyroidism    PLAN    adm to tele,   acs protocol,   ce q8 x3 neg  f/u echo and stress test in am  cardio cons noted  cta with breast nodule but neg for aortic aneurysm or pe noted above  f/u diagnostic miguel right breast  f/u esr, gui, ck, rf  cont pain control  pain mgmt eval  cont current meds

## 2017-10-29 NOTE — PROGRESS NOTE ADULT - ATTENDING COMMENTS
Patient seen and examined.  Agree with above.   -Check TTE  -Check NST    Annie Caldera MD  [facility]  [address], [address]  [place] [address]  office: [phone]  pager: [phone]

## 2017-10-29 NOTE — PROGRESS NOTE ADULT - ASSESSMENT
Patient is a 75 y/o F pt from home, with PMHx of HLD, GERD and hypothyroidism,  and no significant PSHx presents to ED c/o intermittent back pain radiating to left arm and neck for 2 weeks.     Patient has stable labs and vitals in the ED, s/p 1 Full dos of ASA. Got CTA Chest and Abdomen in the ED, showed no evidence of dissection or aneurysm. 8mm right breast nodule, recommended Mammogram but patient says she had mammogram 1 year ago that was WNL.

## 2017-10-30 ENCOUNTER — TRANSCRIPTION ENCOUNTER (OUTPATIENT)
Age: 77
End: 2017-10-30

## 2017-10-30 VITALS
TEMPERATURE: 98 F | HEART RATE: 60 BPM | SYSTOLIC BLOOD PRESSURE: 115 MMHG | OXYGEN SATURATION: 94 % | DIASTOLIC BLOOD PRESSURE: 53 MMHG | RESPIRATION RATE: 17 BRPM

## 2017-10-30 LAB
ANION GAP SERPL CALC-SCNC: 8 MMOL/L — SIGNIFICANT CHANGE UP (ref 5–17)
BUN SERPL-MCNC: 13 MG/DL — SIGNIFICANT CHANGE UP (ref 7–18)
CALCIUM SERPL-MCNC: 8.9 MG/DL — SIGNIFICANT CHANGE UP (ref 8.4–10.5)
CHLORIDE SERPL-SCNC: 104 MMOL/L — SIGNIFICANT CHANGE UP (ref 96–108)
CO2 SERPL-SCNC: 27 MMOL/L — SIGNIFICANT CHANGE UP (ref 22–31)
CREAT SERPL-MCNC: 0.51 MG/DL — SIGNIFICANT CHANGE UP (ref 0.5–1.3)
GLUCOSE SERPL-MCNC: 92 MG/DL — SIGNIFICANT CHANGE UP (ref 70–99)
HCT VFR BLD CALC: 42.4 % — SIGNIFICANT CHANGE UP (ref 34.5–45)
HGB BLD-MCNC: 14.4 G/DL — SIGNIFICANT CHANGE UP (ref 11.5–15.5)
MAGNESIUM SERPL-MCNC: 2.4 MG/DL — SIGNIFICANT CHANGE UP (ref 1.6–2.6)
MCHC RBC-ENTMCNC: 30.5 PG — SIGNIFICANT CHANGE UP (ref 27–34)
MCHC RBC-ENTMCNC: 34 GM/DL — SIGNIFICANT CHANGE UP (ref 32–36)
MCV RBC AUTO: 89.7 FL — SIGNIFICANT CHANGE UP (ref 80–100)
PHOSPHATE SERPL-MCNC: 3.4 MG/DL — SIGNIFICANT CHANGE UP (ref 2.5–4.5)
PLATELET # BLD AUTO: 201 K/UL — SIGNIFICANT CHANGE UP (ref 150–400)
POTASSIUM SERPL-MCNC: 3.8 MMOL/L — SIGNIFICANT CHANGE UP (ref 3.5–5.3)
POTASSIUM SERPL-SCNC: 3.8 MMOL/L — SIGNIFICANT CHANGE UP (ref 3.5–5.3)
RBC # BLD: 4.73 M/UL — SIGNIFICANT CHANGE UP (ref 3.8–5.2)
RBC # FLD: 12.3 % — SIGNIFICANT CHANGE UP (ref 10.3–14.5)
SODIUM SERPL-SCNC: 139 MMOL/L — SIGNIFICANT CHANGE UP (ref 135–145)
WBC # BLD: 5.9 K/UL — SIGNIFICANT CHANGE UP (ref 3.8–10.5)
WBC # FLD AUTO: 5.9 K/UL — SIGNIFICANT CHANGE UP (ref 3.8–10.5)

## 2017-10-30 PROCEDURE — A9502: CPT

## 2017-10-30 PROCEDURE — 83735 ASSAY OF MAGNESIUM: CPT

## 2017-10-30 PROCEDURE — 93017 CV STRESS TEST TRACING ONLY: CPT

## 2017-10-30 PROCEDURE — 93306 TTE W/DOPPLER COMPLETE: CPT

## 2017-10-30 PROCEDURE — 82607 VITAMIN B-12: CPT

## 2017-10-30 PROCEDURE — G0378: CPT

## 2017-10-30 PROCEDURE — 71275 CT ANGIOGRAPHY CHEST: CPT

## 2017-10-30 PROCEDURE — 80076 HEPATIC FUNCTION PANEL: CPT

## 2017-10-30 PROCEDURE — 74174 CTA ABD&PLVS W/CONTRAST: CPT

## 2017-10-30 PROCEDURE — 78452 HT MUSCLE IMAGE SPECT MULT: CPT

## 2017-10-30 PROCEDURE — 80048 BASIC METABOLIC PNL TOTAL CA: CPT

## 2017-10-30 PROCEDURE — 82550 ASSAY OF CK (CPK): CPT

## 2017-10-30 PROCEDURE — 84443 ASSAY THYROID STIM HORMONE: CPT

## 2017-10-30 PROCEDURE — 83880 ASSAY OF NATRIURETIC PEPTIDE: CPT

## 2017-10-30 PROCEDURE — 80061 LIPID PANEL: CPT

## 2017-10-30 PROCEDURE — 82746 ASSAY OF FOLIC ACID SERUM: CPT

## 2017-10-30 PROCEDURE — 80053 COMPREHEN METABOLIC PANEL: CPT

## 2017-10-30 PROCEDURE — 99285 EMERGENCY DEPT VISIT HI MDM: CPT | Mod: 25

## 2017-10-30 PROCEDURE — 85027 COMPLETE CBC AUTOMATED: CPT

## 2017-10-30 PROCEDURE — 84439 ASSAY OF FREE THYROXINE: CPT

## 2017-10-30 PROCEDURE — 82553 CREATINE MB FRACTION: CPT

## 2017-10-30 PROCEDURE — 84100 ASSAY OF PHOSPHORUS: CPT

## 2017-10-30 PROCEDURE — 84484 ASSAY OF TROPONIN QUANT: CPT

## 2017-10-30 PROCEDURE — 93005 ELECTROCARDIOGRAM TRACING: CPT

## 2017-10-30 PROCEDURE — 83036 HEMOGLOBIN GLYCOSYLATED A1C: CPT

## 2017-10-30 RX ORDER — LEVOTHYROXINE SODIUM 125 MCG
1 TABLET ORAL
Qty: 30 | Refills: 0
Start: 2017-10-30 | End: 2017-11-29

## 2017-10-30 RX ORDER — LEVOTHYROXINE SODIUM 125 MCG
1 TABLET ORAL
Qty: 0 | Refills: 0 | COMMUNITY

## 2017-10-30 RX ORDER — METOPROLOL TARTRATE 50 MG
0.5 TABLET ORAL
Qty: 30 | Refills: 0
Start: 2017-10-30 | End: 2017-11-29

## 2017-10-30 RX ORDER — INFLUENZA VIRUS VACCINE 15; 15; 15; 15 UG/.5ML; UG/.5ML; UG/.5ML; UG/.5ML
0 SUSPENSION INTRAMUSCULAR
Qty: 0 | Refills: 0 | DISCHARGE
Start: 2017-10-30

## 2017-10-30 RX ORDER — ASPIRIN/CALCIUM CARB/MAGNESIUM 324 MG
1 TABLET ORAL
Qty: 0 | Refills: 0 | DISCHARGE
Start: 2017-10-30

## 2017-10-30 RX ADMIN — PANTOPRAZOLE SODIUM 40 MILLIGRAM(S): 20 TABLET, DELAYED RELEASE ORAL at 06:05

## 2017-10-30 RX ADMIN — Medication 81 MILLIGRAM(S): at 12:03

## 2017-10-30 RX ADMIN — Medication 75 MICROGRAM(S): at 06:05

## 2017-10-30 NOTE — DISCHARGE NOTE ADULT - PATIENT PORTAL LINK FT
“You can access the Follow[facility] Patient Portal, offered by [facility], by registering with the following website: [email or web address]”

## 2017-10-30 NOTE — DISCHARGE NOTE ADULT - CARE PLAN
Principal Discharge DX:	ACS (acute coronary syndrome)  Goal:	Ruled out. prevent reoccurrence of chest pain  Instructions for follow-up, activity and diet:	you were treated for suspected acute coronary syndrome. your cardiac enzymes were negative. your EKG was negative for any acute changes. you had a ECHOcardiogram showed grade I dystolic dysfunction. you had a stress test which was negative for any ischemia on exertion. continue with aspirin, simvastatin and metoprolol 12.5 mg ( half table of 25 mg) two times a day . follow up with your pcp in a month for better continuity of care.  Secondary Diagnosis:	GERD (gastroesophageal reflux disease)  Instructions for follow-up, activity and diet:	continue with omeprazole 40 mg daily.  Secondary Diagnosis:	HLD (hyperlipidemia)  Instructions for follow-up, activity and diet:	continue with simvastatin 20 mg. you lipid profile is slightly deranged. likely secondary to hypothyroidsm.  Secondary Diagnosis:	HTN (hypertension)  Instructions for follow-up, activity and diet:	continue with metoprolol. follow up with pcp in 1 month.  Secondary Diagnosis:	Hypothyroid  Instructions for follow-up, activity and diet:	your Thyroid stimulating hormone levels are elevated despite being on levothyroxine 75 mcg. as you have history of hypothyroidsm. your TSH levels are 14.9 and Free t4 levels are 1.2 (0.9-1.8). T4 is in range but TSH is very high. we are increasing your dose of Levothyroxine to 100 mcg. follow up with your primary care doctor in 3 months for a repeat TSH level and free T4 level. take Levothyroxine on empty stomach as food impair absorption of levothyroxine.  Secondary Diagnosis:	Breast nodule  Instructions for follow-up, activity and diet:	we did a CT chest angio for suspected aortic dissection which accidently came out positive for 8 mm nodule in right breast, follow up out patient with your primary care doctor for a referral of diagnostic mammogram as soon as possible

## 2017-10-30 NOTE — DISCHARGE NOTE ADULT - PLAN OF CARE
Ruled out. prevent reoccurrence of chest pain you were treated for suspected acute coronary syndrome. your cardiac enzymes were negative. your EKG was negative for any acute changes. you had a ECHOcardiogram showed grade I dystolic dysfunction. you had a stress test which was negative for any ischemia on exertion. continue with aspirin, simvastatin and metoprolol 12.5 mg ( half table of 25 mg) two times a day . follow up with your pcp in a month for better continuity of care. continue with omeprazole 40 mg daily. continue with simvastatin 20 mg. you lipid profile is slightly deranged. likely secondary to hypothyroidsm. continue with metoprolol. follow up with pcp in 1 month. your Thyroid stimulating hormone levels are elevated despite being on levothyroxine 75 mcg. as you have history of hypothyroidsm. your TSH levels are 14.9 and Free t4 levels are 1.2 (0.9-1.8). T4 is in range but TSH is very high. we are increasing your dose of Levothyroxine to 100 mcg. follow up with your primary care doctor in 3 months for a repeat TSH level and free T4 level. take Levothyroxine on empty stomach as food impair absorption of levothyroxine. we did a CT chest angio for suspected aortic dissection which accidently came out positive for 8 mm nodule in right breast, follow up out patient with your primary care doctor for a referral of diagnostic mammogram as soon as possible

## 2017-10-30 NOTE — PROGRESS NOTE ADULT - SUBJECTIVE AND OBJECTIVE BOX
Patient is a 76y old  Female who presents with a chief complaint of Back pain (28 Oct 2017 00:49)    pt seen in tele [x  ], reg med floor [   ], bed [ ], chair at bedside [ x  ], a+o x3 [ x ], lethargic [  ],   nad [ x ]      Allergies    No Known Allergies        Vitals    T(F): 98.5 (10-30-17 @ 07:06), Max: 98.7 (10-29-17 @ 11:49)  HR: 51 (10-30-17 @ 07:06) (51 - 58)  BP: 122/63 (10-30-17 @ 07:06) (121/53 - 132/61)  RR: 16 (10-30-17 @ 07:06) (16 - 18)  SpO2: 98% (10-30-17 @ 07:06) (95% - 98%)  Wt(kg): --  CAPILLARY BLOOD GLUCOSE          Labs                          14.4   5.9   )-----------( 201      ( 30 Oct 2017 07:19 )             42.4       10-30    139  |  104  |  13  ----------------------------<  92  3.8   |  27  |  0.51    Ca    8.9      30 Oct 2017 07:19  Phos  3.4     10-30  Mg     2.4     10-30        CARDIAC MARKERS ( 28 Oct 2017 12:22 )  <0.015 ng/mL / x     / 74 U/L / x     / <1.0 ng/mL            Radiology Results  < from: Transthoracic Echocardiogram (10.29.17 @ 07:23) >  CONCLUSIONS:  1. Normal mitral valve.  2. Normal trileaflet aortic valve.  3. Aortic Root: 3.3 cm.  4. Mild left atrial enlargement.  5. Normal left ventricular internal dimensions and wall  thicknesses.  6. Normal Left Ventricular Systolic Function,  (EF = 55 to  60%)  7. Grade I diastolic dysfunction (Impaired relaxation).  8. Normal right atrium.  9. Normal right ventricular size and function.  10. RV systolic pressure is mildly increased at  44 mm Hg.  11. Normal tricuspid valve.  12. Normal pulmonic valve.  13. Normal pericardium with no pericardial effusion.    < end of copied text >      Meds    MEDICATIONS  (STANDING):  aspirin enteric coated 81 milliGRAM(s) Oral daily  atorvastatin 40 milliGRAM(s) Oral at bedtime  influenza   Vaccine 0.5 milliLiter(s) IntraMuscular once  levothyroxine 75 MICROGram(s) Oral daily  metoprolol     tartrate 12.5 milliGRAM(s) Oral two times a day  pantoprazole    Tablet 40 milliGRAM(s) Oral before breakfast      MEDICATIONS  (PRN):  acetaminophen   Tablet. 650 milliGRAM(s) Oral every 6 hours PRN Moderate Pain (4 - 6)  guaiFENesin    Syrup 100 milliGRAM(s) Oral every 6 hours PRN Cough  nitroglycerin     SubLingual 0.3 milliGRAM(s) SubLingual every 5 minutes PRN Chest Pain      Physical Exam    Neuro :  no focal deficits  Respiratory: CTA B/L  CV: RRR, S1S2, no murmurs,   Abdominal: Soft, NT, ND +BS,  Extremities: No edema, + peripheral pulses    ASSESSMENT    chest pain  r/o Acute ischemic heart disease  right breast nodule  generalized back and shoulder pain  h/o HLD,   GERD   hypothyroidism    PLAN    adm to tele,   acs protocol,   ce q8 x3 neg  f/u echo and stress test in am  cardio cons noted  echo results noted above  cta with breast nodule but neg for aortic aneurysm or pe noted   f/u diagnostic miguel right breast  f/u esr, gui, ck, rf  cont pain control  pain mgmt eval  cont current meds Patient is a 76y old  Female who presents with a chief complaint of Back pain (28 Oct 2017 00:49)    pt seen in tele [x  ], reg med floor [   ], bed [ ], chair at bedside [ x  ], a+o x3 [ x ], lethargic [  ],   nad [ x ]      Allergies    No Known Allergies        Vitals    T(F): 98.5 (10-30-17 @ 07:06), Max: 98.7 (10-29-17 @ 11:49)  HR: 51 (10-30-17 @ 07:06) (51 - 58)  BP: 122/63 (10-30-17 @ 07:06) (121/53 - 132/61)  RR: 16 (10-30-17 @ 07:06) (16 - 18)  SpO2: 98% (10-30-17 @ 07:06) (95% - 98%)  Wt(kg): --  CAPILLARY BLOOD GLUCOSE          Labs                          14.4   5.9   )-----------( 201      ( 30 Oct 2017 07:19 )             42.4       10-30    139  |  104  |  13  ----------------------------<  92  3.8   |  27  |  0.51    Ca    8.9      30 Oct 2017 07:19  Phos  3.4     10-30  Mg     2.4     10-30        CARDIAC MARKERS ( 28 Oct 2017 12:22 )  <0.015 ng/mL / x     / 74 U/L / x     / <1.0 ng/mL            Radiology Results  < from: Transthoracic Echocardiogram (10.29.17 @ 07:23) >  CONCLUSIONS:  1. Normal mitral valve.  2. Normal trileaflet aortic valve.  3. Aortic Root: 3.3 cm.  4. Mild left atrial enlargement.  5. Normal left ventricular internal dimensions and wall  thicknesses.  6. Normal Left Ventricular Systolic Function,  (EF = 55 to  60%)  7. Grade I diastolic dysfunction (Impaired relaxation).  8. Normal right atrium.  9. Normal right ventricular size and function.  10. RV systolic pressure is mildly increased at  44 mm Hg.  11. Normal tricuspid valve.  12. Normal pulmonic valve.  13. Normal pericardium with no pericardial effusion.    < end of copied text >      Meds    MEDICATIONS  (STANDING):  aspirin enteric coated 81 milliGRAM(s) Oral daily  atorvastatin 40 milliGRAM(s) Oral at bedtime  influenza   Vaccine 0.5 milliLiter(s) IntraMuscular once  levothyroxine 75 MICROGram(s) Oral daily  metoprolol     tartrate 12.5 milliGRAM(s) Oral two times a day  pantoprazole    Tablet 40 milliGRAM(s) Oral before breakfast      MEDICATIONS  (PRN):  acetaminophen   Tablet. 650 milliGRAM(s) Oral every 6 hours PRN Moderate Pain (4 - 6)  guaiFENesin    Syrup 100 milliGRAM(s) Oral every 6 hours PRN Cough  nitroglycerin     SubLingual 0.3 milliGRAM(s) SubLingual every 5 minutes PRN Chest Pain      Physical Exam    Neuro :  no focal deficits  Respiratory: CTA B/L  CV: RRR, S1S2, no murmurs,   Abdominal: Soft, NT, ND +BS,  Extremities: No edema, + peripheral pulses    ASSESSMENT    chest pain  r/o Acute ischemic heart disease  right breast nodule  generalized back and shoulder pain  h/o HLD,   GERD   hypothyroidism    PLAN    cont tele,   ce q8 x3 neg  f/u stress test  cardio f/u  echo results noted above  cta with breast nodule but neg for aortic aneurysm or pe noted   pt to have diagnostic miguel right breast as out pt (d/w pt and daughter at bedside)  no further complain of pain  cont current meds  d/c plan if stress test neg

## 2017-10-30 NOTE — DISCHARGE NOTE ADULT - SECONDARY DIAGNOSIS.
GERD (gastroesophageal reflux disease) HLD (hyperlipidemia) HTN (hypertension) Hypothyroid Breast nodule

## 2017-10-30 NOTE — DISCHARGE NOTE ADULT - HOSPITAL COURSE
Patient is a 75 y/o F pt from home, with PMHx of HLD, GERD and hypothyroidism,  and no significant PSHx presents to ED c/o intermittent back pain radiating to left arm and neck for 2 weeks.  patient describes the Back pain as achy, no recent unusual exercise or back trauma. intermittent, non exertional and radiates to the left arm. No CP, but feels SOB on climbing stairs. denies orthopnea but states that she has been sleeping in sitting position in night for the past week or so, no peripheral edema, No PND. Also admits to cough productive of white sputum only since today. ROS negative except above. Pt was referred for admission by PMD after obtaining an abnormal EKG today.  NKDA. Used to smoker 30 years ago, never drinker, FHx of CAD in mother, who dies of MI and in both brothers. No Surgical Hx    patient on floor  was treated for suspected acute coronary syndrome. cardiac enzymes were negative. EKG was negative for any acute changes. had a ECHOcardiogram showed grade I dystolic dysfunction. had a stress test which was negative for any ischemia on exertion. continued with aspirin, simvastatin and metoprolol 12.5 mg ( half table of 25 mg) two times a day .advised to follow up with pcp in a month for better continuity of care. For GERD patient was advised to  continue with omeprazole 40 mg daily. For Hyperlipidemia patient was advised to continue with simvastatin 20 mg. lipid profile is slightly deranged. likely secondary to hypothyroidsm. for hypertensione patient was advised to   continue with metoprolol. follow up with pcp in 1 month. patient's Thyroid stimulating hormone levels are elevated despite being on levothyroxine 75 mcg. as patient  have history of hypothyroidsm. TSH levels are 14.9 and Free t4 levels are 1.2 (0.9-1.8). T4 is in range but TSH is very high. we increased dose of Levothyroxine to 100 mcg. advised to follow up with  primary care doctor in 3 months for a repeat TSH level and free T4 level. advised to take Levothyroxine on empty stomach as food impair absorption of levothyroxine.   did a CT chest angio for suspected aortic dissection which accidently came out positive for 8 mm nodule in right breast, follow up out patient with primary care doctor for a referral of diagnostic mammogram as soon as possible.    Plan of care was discussed with patient. patient understand and agreed with the plan. patient will be discharged to home in stable condition.

## 2017-10-30 NOTE — DISCHARGE NOTE ADULT - MEDICATION SUMMARY - MEDICATIONS TO STOP TAKING
I will STOP taking the medications listed below when I get home from the hospital:    Synthroid 75 mcg (0.075 mg) oral tablet  -- 1 tab(s) by mouth once a day

## 2017-10-30 NOTE — PROGRESS NOTE ADULT - ATTENDING COMMENTS
Patient seen and examined, agree with above assessment and plan as transcribed above.    - normal Echo  - F/u nuclear stress    Zia Lares MD, FACC  [facility], [facility]  [address] [place] [address]  PHONE:  [phone]  BEEPER : [phone]

## 2017-10-30 NOTE — DISCHARGE NOTE ADULT - MEDICATION SUMMARY - MEDICATIONS TO TAKE
I will START or STAY ON the medications listed below when I get home from the hospital:    aspirin 81 mg oral delayed release tablet  -- 1 tab(s) by mouth once a day  -- Indication: For cardioprotective    simvastatin 20 mg oral tablet  -- 1 tab(s) by mouth once a day (at bedtime)  -- Indication: For Hyperlipidemia    metoprolol tartrate 25 mg oral tablet  -- 0.5 tab(s) by mouth 2 times a day   -- It is very important that you take or use this exactly as directed.  Do not skip doses or discontinue unless directed by your doctor.  May cause drowsiness.  Alcohol may intensify this effect.  Use care when operating dangerous machinery.  Some non-prescription drugs may aggravate your condition.  Read all labels carefully.  If a warning appears, check with your doctor before taking.  Take with food or milk.  This drug may impair the ability to drive or operate machinery.  Use care until you become familiar with its effects.    -- Indication: For Hypertension    influenza virus vaccine, inactivated  -- Indication: For flu shot    omeprazole 40 mg oral delayed release capsule  -- 1 cap(s) by mouth once a day  -- Indication: For GERD (gastroesophageal reflux disease)    levothyroxine 100 mcg (0.1 mg) oral tablet  -- 1 tab(s) by mouth once a day  -- Indication: For Hypothyroid

## 2017-10-30 NOTE — PROGRESS NOTE ADULT - SUBJECTIVE AND OBJECTIVE BOX
Subjective:  pt seen and examined, no complaints on exam.     acetaminophen   Tablet. 650 milliGRAM(s) Oral every 6 hours PRN  aspirin enteric coated 81 milliGRAM(s) Oral daily  atorvastatin 40 milliGRAM(s) Oral at bedtime  guaiFENesin    Syrup 100 milliGRAM(s) Oral every 6 hours PRN  influenza   Vaccine 0.5 milliLiter(s) IntraMuscular once  levothyroxine 75 MICROGram(s) Oral daily  metoprolol     tartrate 12.5 milliGRAM(s) Oral two times a day  nitroglycerin     SubLingual 0.3 milliGRAM(s) SubLingual every 5 minutes PRN  pantoprazole    Tablet 40 milliGRAM(s) Oral before breakfast                            14.5   6.0   )-----------( 196      ( 29 Oct 2017 07:34 )             44.0       Hemoglobin: 14.5 g/dL (10-29 @ 07:34)  Hemoglobin: 13.8 g/dL (10-28 @ 04:41)  Hemoglobin: 14.5 g/dL (10-27 @ 21:06)      10-29    139  |  105  |  13  ----------------------------<  95  4.0   |  28  |  0.50    Ca    8.8      29 Oct 2017 07:34  Phos  3.2     10-29  Mg     2.4     10-29    TPro  7.3  /  Alb  3.5  /  TBili  1.1  /  DBili  0.2  /  AST  14  /  ALT  21  /  AlkPhos  73  10-28    Creatinine Trend: 0.50<--, 0.51<--, 0.59<--    COAGS:     CARDIAC MARKERS ( 28 Oct 2017 12:22 )  <0.015 ng/mL / x     / 74 U/L / x     / <1.0 ng/mL  CARDIAC MARKERS ( 28 Oct 2017 04:41 )  <0.015 ng/mL / x     / 81 U/L / x     / <1.0 ng/mL  CARDIAC MARKERS ( 27 Oct 2017 21:06 )  <0.015 ng/mL / x     / 101 U/L / x     / x            T(C): 36.9 (10-29-17 @ 23:53), Max: 37.1 (10-29-17 @ 11:49)  HR: 52 (10-29-17 @ 23:53) (52 - 58)  BP: 121/53 (10-29-17 @ 23:53) (111/67 - 132/61)  RR: 17 (10-29-17 @ 23:53) (16 - 20)  SpO2: 95% (10-29-17 @ 23:53) (95% - 98%)  Wt(kg): --    I&O's Summary    29 Oct 2017 07:01  -  30 Oct 2017 03:51  --------------------------------------------------------  IN: 900 mL / OUT: 0 mL / NET: 900 mL      HEENT:   Normal oral mucosa, PERRL, EOMI	  Lymphatic: No lymphadenopathy , no edema  Cardiovascular: Normal S1 S2, No JVD, No murmurs , Peripheral pulses palpable 2+ bilaterally  Respiratory: Lungs clear to auscultation, normal effort 	  Gastrointestinal:  Soft, Non-tender, + BS	      TELEMETRY:  NSR 	      [ ] Echocardiogram:   [ ]  Catheterization:  [ ] Stress Test:    OTHER: 	  CT angio : < from: CT Angio Chest w/ IV Cont (10.27.17 @ 22:43) >  IMPRESSION: No evidence of aortic dissection or aneurysm. Patent   mesenteric vasculature.    8mm asymmetric right breast nodule. Correlation with mammography is   recommended.    RED CHANDLER M.D., RADIOLOGIST  This document has been electronically signed. Oct 27 2017 10:50PM    < end of copied text >        ASSESSMENT/PLAN: 	76y Female  with HLD, GERD, hypothyroidism admitted with chest pain.     cardiac markers neg x 3   echo and nst pending   cont asa / statins  bp controlled with low dose BB   GI / DVT prophylaxis.   keep K>4, mag >2.0  further plan post above   no s/s of chf   D/W Dr Lares

## 2017-10-30 NOTE — DISCHARGE NOTE ADULT - CARE PROVIDER_API CALL
Josie Santiago (DO), Internal Medicine  [address]  [place] [address]  Phone: [phone]  Fax: [phone]

## 2017-11-09 DIAGNOSIS — Z87.891 PERSONAL HISTORY OF NICOTINE DEPENDENCE: ICD-10-CM

## 2017-11-09 DIAGNOSIS — N63.0 UNSPECIFIED LUMP IN UNSPECIFIED BREAST: ICD-10-CM

## 2017-11-09 DIAGNOSIS — I24.9 ACUTE ISCHEMIC HEART DISEASE, UNSPECIFIED: ICD-10-CM

## 2017-11-09 DIAGNOSIS — K21.9 GASTRO-ESOPHAGEAL REFLUX DISEASE WITHOUT ESOPHAGITIS: ICD-10-CM

## 2017-11-09 DIAGNOSIS — E78.5 HYPERLIPIDEMIA, UNSPECIFIED: ICD-10-CM

## 2017-11-09 DIAGNOSIS — E03.9 HYPOTHYROIDISM, UNSPECIFIED: ICD-10-CM

## 2019-06-19 ENCOUNTER — INPATIENT (INPATIENT)
Facility: HOSPITAL | Age: 79
LOS: 4 days | Discharge: HOME | End: 2019-06-24
Attending: INTERNAL MEDICINE | Admitting: INTERNAL MEDICINE
Payer: MEDICARE

## 2019-06-19 VITALS
SYSTOLIC BLOOD PRESSURE: 179 MMHG | HEART RATE: 69 BPM | TEMPERATURE: 98 F | DIASTOLIC BLOOD PRESSURE: 80 MMHG | OXYGEN SATURATION: 97 % | RESPIRATION RATE: 22 BRPM

## 2019-06-19 LAB
ALBUMIN SERPL ELPH-MCNC: 4.5 G/DL — SIGNIFICANT CHANGE UP (ref 3.3–5)
ALP SERPL-CCNC: 74 U/L — SIGNIFICANT CHANGE UP (ref 40–120)
ALT FLD-CCNC: 17 U/L — SIGNIFICANT CHANGE UP (ref 4–33)
ANION GAP SERPL CALC-SCNC: 12 MMO/L — SIGNIFICANT CHANGE UP (ref 7–14)
AST SERPL-CCNC: 17 U/L — SIGNIFICANT CHANGE UP (ref 4–32)
BASE EXCESS BLDV CALC-SCNC: 4.3 MMOL/L — SIGNIFICANT CHANGE UP
BASOPHILS # BLD AUTO: 0.06 K/UL — SIGNIFICANT CHANGE UP (ref 0–0.2)
BASOPHILS NFR BLD AUTO: 0.7 % — SIGNIFICANT CHANGE UP (ref 0–2)
BILIRUB SERPL-MCNC: 1.1 MG/DL — SIGNIFICANT CHANGE UP (ref 0.2–1.2)
BLOOD GAS VENOUS - CREATININE: 0.54 MG/DL — SIGNIFICANT CHANGE UP (ref 0.5–1.3)
BUN SERPL-MCNC: 15 MG/DL — SIGNIFICANT CHANGE UP (ref 7–23)
CALCIUM SERPL-MCNC: 9.3 MG/DL — SIGNIFICANT CHANGE UP (ref 8.4–10.5)
CHLORIDE BLDV-SCNC: 104 MMOL/L — SIGNIFICANT CHANGE UP (ref 96–108)
CHLORIDE SERPL-SCNC: 101 MMOL/L — SIGNIFICANT CHANGE UP (ref 98–107)
CO2 SERPL-SCNC: 26 MMOL/L — SIGNIFICANT CHANGE UP (ref 22–31)
CREAT SERPL-MCNC: 0.6 MG/DL — SIGNIFICANT CHANGE UP (ref 0.5–1.3)
EOSINOPHIL # BLD AUTO: 0.24 K/UL — SIGNIFICANT CHANGE UP (ref 0–0.5)
EOSINOPHIL NFR BLD AUTO: 2.8 % — SIGNIFICANT CHANGE UP (ref 0–6)
GAS PNL BLDV: 139 MMOL/L — SIGNIFICANT CHANGE UP (ref 136–146)
GLUCOSE BLDV-MCNC: 105 MG/DL — HIGH (ref 70–99)
GLUCOSE SERPL-MCNC: 110 MG/DL — HIGH (ref 70–99)
HCO3 BLDV-SCNC: 26 MMOL/L — SIGNIFICANT CHANGE UP (ref 20–27)
HCT VFR BLD CALC: 43.3 % — SIGNIFICANT CHANGE UP (ref 34.5–45)
HCT VFR BLDV CALC: 44.5 % — SIGNIFICANT CHANGE UP (ref 34.5–45)
HGB BLD-MCNC: 14 G/DL — SIGNIFICANT CHANGE UP (ref 11.5–15.5)
HGB BLDV-MCNC: 14.5 G/DL — SIGNIFICANT CHANGE UP (ref 11.5–15.5)
IMM GRANULOCYTES NFR BLD AUTO: 0.4 % — SIGNIFICANT CHANGE UP (ref 0–1.5)
LACTATE BLDV-MCNC: 1 MMOL/L — SIGNIFICANT CHANGE UP (ref 0.5–2)
LYMPHOCYTES # BLD AUTO: 2.69 K/UL — SIGNIFICANT CHANGE UP (ref 1–3.3)
LYMPHOCYTES # BLD AUTO: 31.7 % — SIGNIFICANT CHANGE UP (ref 13–44)
MCHC RBC-ENTMCNC: 28.3 PG — SIGNIFICANT CHANGE UP (ref 27–34)
MCHC RBC-ENTMCNC: 32.3 % — SIGNIFICANT CHANGE UP (ref 32–36)
MCV RBC AUTO: 87.5 FL — SIGNIFICANT CHANGE UP (ref 80–100)
MONOCYTES # BLD AUTO: 0.76 K/UL — SIGNIFICANT CHANGE UP (ref 0–0.9)
MONOCYTES NFR BLD AUTO: 9 % — SIGNIFICANT CHANGE UP (ref 2–14)
NEUTROPHILS # BLD AUTO: 4.7 K/UL — SIGNIFICANT CHANGE UP (ref 1.8–7.4)
NEUTROPHILS NFR BLD AUTO: 55.4 % — SIGNIFICANT CHANGE UP (ref 43–77)
NRBC # FLD: 0 K/UL — SIGNIFICANT CHANGE UP (ref 0–0)
NT-PROBNP SERPL-SCNC: 211 PG/ML — SIGNIFICANT CHANGE UP
PCO2 BLDV: 50 MMHG — SIGNIFICANT CHANGE UP (ref 41–51)
PH BLDV: 7.39 PH — SIGNIFICANT CHANGE UP (ref 7.32–7.43)
PLATELET # BLD AUTO: 234 K/UL — SIGNIFICANT CHANGE UP (ref 150–400)
PMV BLD: 11.6 FL — SIGNIFICANT CHANGE UP (ref 7–13)
PO2 BLDV: 31 MMHG — LOW (ref 35–40)
POTASSIUM BLDV-SCNC: 4 MMOL/L — SIGNIFICANT CHANGE UP (ref 3.4–4.5)
POTASSIUM SERPL-MCNC: 4.4 MMOL/L — SIGNIFICANT CHANGE UP (ref 3.5–5.3)
POTASSIUM SERPL-SCNC: 4.4 MMOL/L — SIGNIFICANT CHANGE UP (ref 3.5–5.3)
PROT SERPL-MCNC: 7.7 G/DL — SIGNIFICANT CHANGE UP (ref 6–8.3)
RBC # BLD: 4.95 M/UL — SIGNIFICANT CHANGE UP (ref 3.8–5.2)
RBC # FLD: 13.3 % — SIGNIFICANT CHANGE UP (ref 10.3–14.5)
SAO2 % BLDV: 53.7 % — LOW (ref 60–85)
SODIUM SERPL-SCNC: 139 MMOL/L — SIGNIFICANT CHANGE UP (ref 135–145)
TROPONIN T, HIGH SENSITIVITY: 7 NG/L — SIGNIFICANT CHANGE UP (ref ?–14)
WBC # BLD: 8.48 K/UL — SIGNIFICANT CHANGE UP (ref 3.8–10.5)
WBC # FLD AUTO: 8.48 K/UL — SIGNIFICANT CHANGE UP (ref 3.8–10.5)

## 2019-06-19 PROCEDURE — 71045 X-RAY EXAM CHEST 1 VIEW: CPT | Mod: 26

## 2019-06-19 RX ORDER — IPRATROPIUM/ALBUTEROL SULFATE 18-103MCG
3 AEROSOL WITH ADAPTER (GRAM) INHALATION
Refills: 0 | Status: COMPLETED | OUTPATIENT
Start: 2019-06-19 | End: 2019-06-19

## 2019-06-19 RX ADMIN — Medication 3 MILLILITER(S): at 23:02

## 2019-06-19 RX ADMIN — Medication 3 MILLILITER(S): at 23:00

## 2019-06-19 RX ADMIN — Medication 125 MILLIGRAM(S): at 23:00

## 2019-06-19 NOTE — ED ADULT NURSE NOTE - OBJECTIVE STATEMENT
pt brought into room 18 A&OX4 amb self care female presents to the ed today for shortness of breath x1 week. pt states she has been taking her perscribed inhalers without relief. Patient has persistent productive cough , states she has not been able to sleep  secondary to constant cough patients BLE appear with +2 pitting edema. No wheezing present. nsr on cardiac monitor

## 2019-06-19 NOTE — ED PROVIDER NOTE - OBJECTIVE STATEMENT
78 F with PMH of Asthma?, CHF and hypothyroidism who presents with productive cough, SOB and exertional chest pain for one week. She has been using Symbicort and albuterol without any relief. No fever or chills. No sick contact. No nasal congestion. She is not aware of any CHF diagnosis and does not take anything for BP at home.

## 2019-06-19 NOTE — ED PROVIDER NOTE - ATTENDING CONTRIBUTION TO CARE
I performed a face-to-face evaluation of the patient and performed a history and physical examination. I agree with the history and physical examination.    Mingo: Cough, rhonchi, LE edema. No F. DDx: CHF (LE edema, h/o diastolic dysfunction), PNA (less likely, no F).

## 2019-06-19 NOTE — ED PROVIDER NOTE - PROGRESS NOTE DETAILS
Patient feeling better in terms of cough and chest pain Spoke with Tele doc of the day who refused admission stating her complaints are not cardiac   Hospitalist would like RVP-admit to them under TELE

## 2019-06-19 NOTE — ED PROVIDER NOTE - PHYSICAL EXAMINATION
PHYSICAL EXAM:  GENERAL: Moderate respiratory distress from coughing   HEAD:  Atraumatic, Normocephalic  EYES: EOMI, PERRLA, conjunctiva and sclera clear  NECK: Supple, No JVD  CHEST/LUNG: Diminished breath sounds at bases. No wheezing. + Ronchi   HEART: Regular rate and rhythm; No murmurs, rubs, or gallops  ABDOMEN: Soft, Nontender, Nondistended; Bowel sounds present  EXTREMITIES:  2+ Peripheral Pulses, 1-2+ pitting edema in LE   PSYCH: AAOx3  NEUROLOGY: non-focal  SKIN: No rashes or lesions

## 2019-06-19 NOTE — ED ADULT TRIAGE NOTE - CHIEF COMPLAINT QUOTE
pt. w/ hx. asthma arrives c/o SOB and constant cough 1x weeks. Pt. states  PCP prescribed Symbicort, albuterol nebs w/ no relief. Pt. appears uncomfortable , constantly coughing , states she has not been able to sleep  secondary to constant cough. No auditory wheezing observed. Pt. c/o chest pain , ekg in progress.

## 2019-06-19 NOTE — ED PROVIDER NOTE - NS ED ROS FT
GEN: no fevers, chills, no night sweats no weight loss , no swollen lymph nodes    EYES: No blurry vision , no changes in vision  ENT: no sores, no runny nose, no sore throat   PULM: POSITIVE As per HPI   CARD: POSITIVE As per HPI   GI : no abdominal pain , no nausea, no vomiting  : no burning urination, no change in color of urine, no flank pain, no blood in urine   MSK:POSITIVE As per HPI   SKIN: no bruising or bleeding, no sores in mouth  , no yellowing of the skin  Neuro: no lightheadedness, no headaches, no weakness, no fainting, no confusion , no seizures

## 2019-06-19 NOTE — ED PROVIDER NOTE - CLINICAL SUMMARY MEDICAL DECISION MAKING FREE TEXT BOX
Mingo: Cough, rhonchi, LE edema. No F. DDx: CHF (LE edema, h/o diastolic dysfunction), PNA (less likely, no F).

## 2019-06-20 DIAGNOSIS — Z98.49 CATARACT EXTRACTION STATUS, UNSPECIFIED EYE: Chronic | ICD-10-CM

## 2019-06-20 DIAGNOSIS — I10 ESSENTIAL (PRIMARY) HYPERTENSION: ICD-10-CM

## 2019-06-20 DIAGNOSIS — Z29.9 ENCOUNTER FOR PROPHYLACTIC MEASURES, UNSPECIFIED: ICD-10-CM

## 2019-06-20 DIAGNOSIS — I50.9 HEART FAILURE, UNSPECIFIED: ICD-10-CM

## 2019-06-20 DIAGNOSIS — E78.5 HYPERLIPIDEMIA, UNSPECIFIED: ICD-10-CM

## 2019-06-20 DIAGNOSIS — I50.30 UNSPECIFIED DIASTOLIC (CONGESTIVE) HEART FAILURE: ICD-10-CM

## 2019-06-20 DIAGNOSIS — Z98.890 OTHER SPECIFIED POSTPROCEDURAL STATES: Chronic | ICD-10-CM

## 2019-06-20 DIAGNOSIS — J44.9 CHRONIC OBSTRUCTIVE PULMONARY DISEASE, UNSPECIFIED: ICD-10-CM

## 2019-06-20 DIAGNOSIS — I24.9 ACUTE ISCHEMIC HEART DISEASE, UNSPECIFIED: ICD-10-CM

## 2019-06-20 DIAGNOSIS — E03.9 HYPOTHYROIDISM, UNSPECIFIED: ICD-10-CM

## 2019-06-20 LAB
B PERT DNA SPEC QL NAA+PROBE: NOT DETECTED — SIGNIFICANT CHANGE UP
C PNEUM DNA SPEC QL NAA+PROBE: NOT DETECTED — SIGNIFICANT CHANGE UP
CHOLEST SERPL-MCNC: 236 MG/DL — HIGH (ref 120–199)
FLUAV H1 2009 PAND RNA SPEC QL NAA+PROBE: NOT DETECTED — SIGNIFICANT CHANGE UP
FLUAV H1 RNA SPEC QL NAA+PROBE: NOT DETECTED — SIGNIFICANT CHANGE UP
FLUAV H3 RNA SPEC QL NAA+PROBE: NOT DETECTED — SIGNIFICANT CHANGE UP
FLUAV SUBTYP SPEC NAA+PROBE: NOT DETECTED — SIGNIFICANT CHANGE UP
FLUBV RNA SPEC QL NAA+PROBE: NOT DETECTED — SIGNIFICANT CHANGE UP
HADV DNA SPEC QL NAA+PROBE: NOT DETECTED — SIGNIFICANT CHANGE UP
HBA1C BLD-MCNC: 5.4 % — SIGNIFICANT CHANGE UP (ref 4–5.6)
HCOV PNL SPEC NAA+PROBE: SIGNIFICANT CHANGE UP
HDLC SERPL-MCNC: 63 MG/DL — SIGNIFICANT CHANGE UP (ref 45–65)
HMPV RNA SPEC QL NAA+PROBE: NOT DETECTED — SIGNIFICANT CHANGE UP
HPIV1 RNA SPEC QL NAA+PROBE: NOT DETECTED — SIGNIFICANT CHANGE UP
HPIV2 RNA SPEC QL NAA+PROBE: NOT DETECTED — SIGNIFICANT CHANGE UP
HPIV3 RNA SPEC QL NAA+PROBE: NOT DETECTED — SIGNIFICANT CHANGE UP
HPIV4 RNA SPEC QL NAA+PROBE: NOT DETECTED — SIGNIFICANT CHANGE UP
LIPID PNL WITH DIRECT LDL SERPL: 176 MG/DL — SIGNIFICANT CHANGE UP
RSV RNA SPEC QL NAA+PROBE: NOT DETECTED — SIGNIFICANT CHANGE UP
RV+EV RNA SPEC QL NAA+PROBE: NOT DETECTED — SIGNIFICANT CHANGE UP
TRIGL SERPL-MCNC: 62 MG/DL — SIGNIFICANT CHANGE UP (ref 10–149)
TROPONIN T, HIGH SENSITIVITY: 8 NG/L — SIGNIFICANT CHANGE UP (ref ?–14)
TSH SERPL-MCNC: 0.61 UIU/ML — SIGNIFICANT CHANGE UP (ref 0.27–4.2)

## 2019-06-20 PROCEDURE — 99223 1ST HOSP IP/OBS HIGH 75: CPT | Mod: AI

## 2019-06-20 PROCEDURE — 99222 1ST HOSP IP/OBS MODERATE 55: CPT | Mod: GC

## 2019-06-20 PROCEDURE — 93306 TTE W/DOPPLER COMPLETE: CPT | Mod: 26

## 2019-06-20 RX ORDER — ALBUTEROL 90 UG/1
2 AEROSOL, METERED ORAL EVERY 6 HOURS
Refills: 0 | Status: DISCONTINUED | OUTPATIENT
Start: 2019-06-20 | End: 2019-06-24

## 2019-06-20 RX ORDER — BUDESONIDE AND FORMOTEROL FUMARATE DIHYDRATE 160; 4.5 UG/1; UG/1
2 AEROSOL RESPIRATORY (INHALATION)
Qty: 0 | Refills: 0 | DISCHARGE

## 2019-06-20 RX ORDER — HEPARIN SODIUM 5000 [USP'U]/ML
5000 INJECTION INTRAVENOUS; SUBCUTANEOUS EVERY 8 HOURS
Refills: 0 | Status: DISCONTINUED | OUTPATIENT
Start: 2019-06-20 | End: 2019-06-24

## 2019-06-20 RX ORDER — PANTOPRAZOLE SODIUM 20 MG/1
40 TABLET, DELAYED RELEASE ORAL
Refills: 0 | Status: DISCONTINUED | OUTPATIENT
Start: 2019-06-20 | End: 2019-06-24

## 2019-06-20 RX ORDER — TIOTROPIUM BROMIDE 18 UG/1
1 CAPSULE ORAL; RESPIRATORY (INHALATION) DAILY
Refills: 0 | Status: DISCONTINUED | OUTPATIENT
Start: 2019-06-20 | End: 2019-06-24

## 2019-06-20 RX ORDER — ASPIRIN/CALCIUM CARB/MAGNESIUM 324 MG
81 TABLET ORAL DAILY
Refills: 0 | Status: DISCONTINUED | OUTPATIENT
Start: 2019-06-20 | End: 2019-06-24

## 2019-06-20 RX ORDER — LEVOTHYROXINE SODIUM 125 MCG
75 TABLET ORAL DAILY
Refills: 0 | Status: DISCONTINUED | OUTPATIENT
Start: 2019-06-20 | End: 2019-06-24

## 2019-06-20 RX ORDER — SIMVASTATIN 20 MG/1
20 TABLET, FILM COATED ORAL AT BEDTIME
Refills: 0 | Status: DISCONTINUED | OUTPATIENT
Start: 2019-06-20 | End: 2019-06-22

## 2019-06-20 RX ORDER — OMEPRAZOLE 10 MG/1
1 CAPSULE, DELAYED RELEASE ORAL
Qty: 0 | Refills: 0 | DISCHARGE

## 2019-06-20 RX ADMIN — Medication 81 MILLIGRAM(S): at 13:35

## 2019-06-20 RX ADMIN — Medication 75 MICROGRAM(S): at 13:35

## 2019-06-20 RX ADMIN — SIMVASTATIN 20 MILLIGRAM(S): 20 TABLET, FILM COATED ORAL at 21:27

## 2019-06-20 RX ADMIN — Medication 100 MILLIGRAM(S): at 13:35

## 2019-06-20 RX ADMIN — HEPARIN SODIUM 5000 UNIT(S): 5000 INJECTION INTRAVENOUS; SUBCUTANEOUS at 13:35

## 2019-06-20 RX ADMIN — TIOTROPIUM BROMIDE 1 CAPSULE(S): 18 CAPSULE ORAL; RESPIRATORY (INHALATION) at 19:25

## 2019-06-20 RX ADMIN — HEPARIN SODIUM 5000 UNIT(S): 5000 INJECTION INTRAVENOUS; SUBCUTANEOUS at 21:27

## 2019-06-20 NOTE — H&P ADULT - ASSESSMENT
77 y/o female with a PmHx of Asthma, Hypothyroidism, diastolic CHF, HTN, and HLD, presented to the [facility] ED c/o SOB and chest pain. Patient will be admitted to telemetry to r/o ACS and to manage CHF exacerbation along with her comorbid conditions.

## 2019-06-20 NOTE — H&P ADULT - MUSCULOSKELETAL
details… detailed exam no joint swelling/no calf tenderness/normal/ROM intact/no joint warmth/normal strength/no joint erythema

## 2019-06-20 NOTE — H&P ADULT - ATTENDING COMMENTS
Pt was seen and examined. Pt reports cough for 2 weeks with whitish phlegm. also non-radiating intermittent pressure sensation in left chest for a week. Pt was prescribed with Symbicort and tessalon by PCP without relief.   appreciate card / pulm recs  stress test plan for tomorrow   treat as COPD, pulm follow up as outpatient

## 2019-06-20 NOTE — H&P ADULT - NEGATIVE OPHTHALMOLOGIC SYMPTOMS
no blurred vision R/no diplopia/no photophobia/no pain R/no loss of vision L/no blurred vision L/no pain L/no loss of vision R

## 2019-06-20 NOTE — CONSULT NOTE ADULT - ATTENDING COMMENTS
77 y/o obese F former smoker, COPD, presenting with cough x 2 weeks and chest pain, being r/o for cardiac cause of chest pain. Unclear etiology of cough, possibly secondary to COPD. No fevers, chlls, night sweats, or hemoptysis. Patient already on PPI for heartburn. No nasal symptoms. CXR unremarkable.     - Continue spiriva and albuterol  - Hycodan  - PPI  - Outpatient pulmonary follow up

## 2019-06-20 NOTE — H&P ADULT - NEGATIVE GASTROINTESTINAL SYMPTOMS
no vomiting/no nausea/no diarrhea/no abdominal pain/no constipation/no change in bowel habits/no hematochezia/no melena

## 2019-06-20 NOTE — H&P ADULT - PROBLEM SELECTOR PLAN 1
admit to telemetry   serial monitoring of cardiac enzymes and EKG  Cardiology consultation for TTE, consider stress test and cath admit to telemetry, serial monitoring of cardiac enzymes and EKG, check Echo,  Consider Cardiology consultation, ?ischemic w/u. F/U MD note

## 2019-06-20 NOTE — H&P ADULT - NSICDXPASTMEDICALHX_GEN_ALL_CORE_FT
PAST MEDICAL HISTORY:  Asthmatic bronchitis , chronic     Cervical spinal stenosis     Diastolic heart failure     HTN (hypertension)     Hyperlipidemia PAST MEDICAL HISTORY:  Asthmatic bronchitis , chronic cough x 5 years    Cervical spinal stenosis     Diastolic heart failure     HTN (hypertension)     Hyperlipidemia

## 2019-06-20 NOTE — H&P ADULT - GASTROINTESTINAL DETAILS
bowel sounds normal/soft/normal/no guarding/no masses palpable/no rigidity/nontender/no distention/no bruit/no rebound tenderness

## 2019-06-20 NOTE — CONSULT NOTE ADULT - ASSESSMENT
EKG - NSR LV H T wave inversion v3 - v5    a/p     1) Chest pains - atypical, stress test in september ok , will repeat 2d echo and nuclear stress test     2) HLD - on zocor     3) DVT prophylasix - sc heparin
79 y/o female former smoker, with emphysema on prior imaging presented to the [facility] ED c/o SOB and chest pain. Pulmonary was consulted for cough. Etiology of cough uncertain--may be related to underlying COPD, viral syndrome. Already on PPI for possible GERD and no evidence of postnasal drip. CXR unremarkable.     - Continue spiriva and albuterol  - Hycodan  - Continue PPI  - Outpatient pulmonary follow up  - Pulmonary will sign off    Patient can follow up with us outpatient at [address] [place] [address], [address]. Number [phone]  Appointments can also be made by e-mailing [email or web address] and providing patient's name, , and discharge diagnosis    Rogelio Brennan MD, PGY4  Pulmonary and Critical Care Fellow  [phone]
- - -

## 2019-06-20 NOTE — H&P ADULT - PROBLEM SELECTOR PLAN 3
consider PFT and peak flow  continue albuterol and budesonide continue benzonate for cough, consider pulmonary eval

## 2019-06-20 NOTE — H&P ADULT - CONSTITUTIONAL DETAILS
well-nourished/well-developed/obese/well-groomed well-groomed/no distress/obese/well-nourished/well-developed

## 2019-06-20 NOTE — H&P ADULT - NSHPSOCIALHISTORY_GEN_ALL_CORE
Patient lives at home with her    Patient is retired  Patient has ~20 year smoking Hx but stopped 30 years ago  Patient denies alcohol abuse   Patient denies illicit drug usage

## 2019-06-20 NOTE — H&P ADULT - RS GEN PE MLT RESP DETAILS PC
respirations labored/diminished breath sounds, L/airway patent/no chest wall tenderness/diminished breath sounds, R/breath sounds equal breath sounds equal/respirations non-labored/airway patent/no chest wall tenderness/good air movement

## 2019-06-20 NOTE — H&P ADULT - NEGATIVE NEUROLOGICAL SYMPTOMS
no paresthesias/no tremors/no generalized seizures/no loss of consciousness/no vertigo/no loss of sensation/no transient paralysis/no weakness

## 2019-06-20 NOTE — H&P ADULT - NEGATIVE MUSCULOSKELETAL SYMPTOMS
no myalgia/no muscle cramps/no joint swelling/no back pain/no leg pain R/no arthralgia/no arthritis/no muscle weakness/no stiffness/no arm pain R/no leg pain L

## 2019-06-20 NOTE — H&P ADULT - HISTORY OF PRESENT ILLNESS
79 y/o female presented to the [facility] ED c/o SOB and chest pain. The patient stated that her chest pain began 7 days ago. The pain is exertional and occurs when she is climbing the stairs in her home. Over the last two days she has noticed that she is having exertional chest pain when walking across her apartment. She felt the pain after walking approximately 10 steps and had to stop and sit down. She describes the pain as a 6/10 cramping pain on the left side of her chest that does not radiated anywhere and improves with rest. She is unable to reproduce the chest pain when palpating her chest and denies exacerbation of pain sx when lying flat. She reports being SOB during the episodes as well, and she needs to sit down and catch her breath after exertion. The patient also reports new onset of 2 pillow orthopnea and PND over the course of the last week. Normally she is able to sleep through the night flat on her back but is now unable to do so. The patient has a chronic cough but has noticed worsening of her cough symptoms over the last 7 days. She reports intermittent cough which produces slightly green sputum. The cough has been causing her to have a new onset of slight stress incontinence. She admits increased b/l leg swelling over the course of the last seven days, constant dull b/l HA, and intermittent left arm and shoulder pain when lifting her arm above her head. She denies correlation between arm pain and chest pain. The patient denies, fever, chills, diaphoresis, weight changes, changes in vision, changes in hearing, dysphagia, wheezing, hemoptysis, pleuritic chest pain, palpitations, N/V/D/C, abdominal pain, changes in bowel habits, melena, hematochezia, dysuria, suprapubic pain, discharge, myalgia, arthralgia, stiffness, weakness, paralysis, paresthesia, vertigo, LOC, heat or cold intolerance 79 y/o female presented to the [facility] ED c/o SOB and chest pain. The patient stated that her chest pain began 7 days ago. She describes the pain as a 6/10 cramping pain on the left side of her chest that does not radiated anywhere. The pain is exertional and occurs when she is climbing the stairs in her home. Over the last two days she has noticed that she is having exertional chest pain when walking across her apartment. She felt the pain after walking approximately 10 steps and had to stop and sit down. CP improves with rest. She is unable to reproduce the chest pain when palpating her chest and denies exacerbation of pain sx when lying flat or taking a deep breath in. She reports being SOB during the episodes as well, and she needs to sit down and catch her breath after exertion. The patient also reports new onset of 2 pillow orthopnea and PND over the course of the last week. Normally she is able to sleep through the night flat on her back but is now unable to do so. The patient has a chronic cough but has noticed worsening of her cough symptoms over the last 7 days. She reports intermittent cough which produces slightly green sputum. The cough has been causing her to have a new onset of slight stress incontinence. She admits increased b/l leg swelling over the course of the last seven days, constant dull b/l HA, and intermittent left arm and shoulder pain when lifting her arm above her head. She denies correlation between arm pain and chest pain. Pt admits to feeling similar CP/RIVERA/ cough 5 years ago but this time it was more intense. Pt saw her PCP about a week ago and was prescribed Albuterol nebs, symbicort and benzonate for presumed asthma which reportedly provided no relief. The patient denies, fever, chills, diaphoresis, weight changes, changes in vision, changes in hearing, dysphagia, wheezing, hemoptysis, pleuritic chest pain, palpitations, N/V/D/C, abdominal pain, changes in bowel habits, melena, hematochezia, dysuria, suprapubic pain, discharge, myalgia, arthralgia, stiffness, weakness, paralysis, paresthesia, vertigo, LOC, heat or cold intolerance

## 2019-06-21 ENCOUNTER — TRANSCRIPTION ENCOUNTER (OUTPATIENT)
Age: 79
End: 2019-06-21

## 2019-06-21 DIAGNOSIS — R05 COUGH: ICD-10-CM

## 2019-06-21 DIAGNOSIS — R07.89 OTHER CHEST PAIN: ICD-10-CM

## 2019-06-21 LAB
ALBUMIN SERPL ELPH-MCNC: 3.8 G/DL — SIGNIFICANT CHANGE UP (ref 3.3–5)
ALP SERPL-CCNC: 62 U/L — SIGNIFICANT CHANGE UP (ref 40–120)
ALT FLD-CCNC: 16 U/L — SIGNIFICANT CHANGE UP (ref 4–33)
ANION GAP SERPL CALC-SCNC: 12 MMO/L — SIGNIFICANT CHANGE UP (ref 7–14)
AST SERPL-CCNC: 16 U/L — SIGNIFICANT CHANGE UP (ref 4–32)
BASOPHILS # BLD AUTO: 0.05 K/UL — SIGNIFICANT CHANGE UP (ref 0–0.2)
BASOPHILS NFR BLD AUTO: 0.6 % — SIGNIFICANT CHANGE UP (ref 0–2)
BILIRUB SERPL-MCNC: 0.8 MG/DL — SIGNIFICANT CHANGE UP (ref 0.2–1.2)
BUN SERPL-MCNC: 14 MG/DL — SIGNIFICANT CHANGE UP (ref 7–23)
CALCIUM SERPL-MCNC: 8.7 MG/DL — SIGNIFICANT CHANGE UP (ref 8.4–10.5)
CHLORIDE SERPL-SCNC: 103 MMOL/L — SIGNIFICANT CHANGE UP (ref 98–107)
CO2 SERPL-SCNC: 25 MMOL/L — SIGNIFICANT CHANGE UP (ref 22–31)
CREAT SERPL-MCNC: 0.54 MG/DL — SIGNIFICANT CHANGE UP (ref 0.5–1.3)
EOSINOPHIL # BLD AUTO: 0.07 K/UL — SIGNIFICANT CHANGE UP (ref 0–0.5)
EOSINOPHIL NFR BLD AUTO: 0.9 % — SIGNIFICANT CHANGE UP (ref 0–6)
GLUCOSE SERPL-MCNC: 97 MG/DL — SIGNIFICANT CHANGE UP (ref 70–99)
HCT VFR BLD CALC: 40.4 % — SIGNIFICANT CHANGE UP (ref 34.5–45)
HGB BLD-MCNC: 13 G/DL — SIGNIFICANT CHANGE UP (ref 11.5–15.5)
IMM GRANULOCYTES NFR BLD AUTO: 0.4 % — SIGNIFICANT CHANGE UP (ref 0–1.5)
LYMPHOCYTES # BLD AUTO: 2.85 K/UL — SIGNIFICANT CHANGE UP (ref 1–3.3)
LYMPHOCYTES # BLD AUTO: 35.7 % — SIGNIFICANT CHANGE UP (ref 13–44)
MAGNESIUM SERPL-MCNC: 2.3 MG/DL — SIGNIFICANT CHANGE UP (ref 1.6–2.6)
MCHC RBC-ENTMCNC: 28.4 PG — SIGNIFICANT CHANGE UP (ref 27–34)
MCHC RBC-ENTMCNC: 32.2 % — SIGNIFICANT CHANGE UP (ref 32–36)
MCV RBC AUTO: 88.2 FL — SIGNIFICANT CHANGE UP (ref 80–100)
MONOCYTES # BLD AUTO: 0.66 K/UL — SIGNIFICANT CHANGE UP (ref 0–0.9)
MONOCYTES NFR BLD AUTO: 8.3 % — SIGNIFICANT CHANGE UP (ref 2–14)
NEUTROPHILS # BLD AUTO: 4.32 K/UL — SIGNIFICANT CHANGE UP (ref 1.8–7.4)
NEUTROPHILS NFR BLD AUTO: 54.1 % — SIGNIFICANT CHANGE UP (ref 43–77)
NRBC # FLD: 0 K/UL — SIGNIFICANT CHANGE UP (ref 0–0)
PHOSPHATE SERPL-MCNC: 3.3 MG/DL — SIGNIFICANT CHANGE UP (ref 2.5–4.5)
PLATELET # BLD AUTO: 217 K/UL — SIGNIFICANT CHANGE UP (ref 150–400)
PMV BLD: 11.8 FL — SIGNIFICANT CHANGE UP (ref 7–13)
POTASSIUM SERPL-MCNC: 3.7 MMOL/L — SIGNIFICANT CHANGE UP (ref 3.5–5.3)
POTASSIUM SERPL-SCNC: 3.7 MMOL/L — SIGNIFICANT CHANGE UP (ref 3.5–5.3)
PROT SERPL-MCNC: 6.8 G/DL — SIGNIFICANT CHANGE UP (ref 6–8.3)
RBC # BLD: 4.58 M/UL — SIGNIFICANT CHANGE UP (ref 3.8–5.2)
RBC # FLD: 13.4 % — SIGNIFICANT CHANGE UP (ref 10.3–14.5)
SODIUM SERPL-SCNC: 140 MMOL/L — SIGNIFICANT CHANGE UP (ref 135–145)
WBC # BLD: 7.98 K/UL — SIGNIFICANT CHANGE UP (ref 3.8–10.5)
WBC # FLD AUTO: 7.98 K/UL — SIGNIFICANT CHANGE UP (ref 3.8–10.5)

## 2019-06-21 PROCEDURE — 93018 CV STRESS TEST I&R ONLY: CPT | Mod: GC

## 2019-06-21 PROCEDURE — 93016 CV STRESS TEST SUPVJ ONLY: CPT | Mod: GC

## 2019-06-21 PROCEDURE — 99233 SBSQ HOSP IP/OBS HIGH 50: CPT

## 2019-06-21 PROCEDURE — 78452 HT MUSCLE IMAGE SPECT MULT: CPT | Mod: 26

## 2019-06-21 RX ADMIN — Medication 81 MILLIGRAM(S): at 12:16

## 2019-06-21 RX ADMIN — HEPARIN SODIUM 5000 UNIT(S): 5000 INJECTION INTRAVENOUS; SUBCUTANEOUS at 13:26

## 2019-06-21 RX ADMIN — TIOTROPIUM BROMIDE 1 CAPSULE(S): 18 CAPSULE ORAL; RESPIRATORY (INHALATION) at 09:57

## 2019-06-21 RX ADMIN — Medication 75 MICROGRAM(S): at 05:44

## 2019-06-21 RX ADMIN — HEPARIN SODIUM 5000 UNIT(S): 5000 INJECTION INTRAVENOUS; SUBCUTANEOUS at 21:52

## 2019-06-21 RX ADMIN — PANTOPRAZOLE SODIUM 40 MILLIGRAM(S): 20 TABLET, DELAYED RELEASE ORAL at 05:44

## 2019-06-21 RX ADMIN — SIMVASTATIN 20 MILLIGRAM(S): 20 TABLET, FILM COATED ORAL at 21:56

## 2019-06-21 RX ADMIN — HEPARIN SODIUM 5000 UNIT(S): 5000 INJECTION INTRAVENOUS; SUBCUTANEOUS at 05:44

## 2019-06-21 NOTE — PROGRESS NOTE ADULT - PROBLEM SELECTOR PLAN 2
TTE showed Grade I diastolic dysfunction.  pt appears euvolemic   f/u Card rec TTE showed Grade I diastolic dysfunction.  pt appears euvolemic   proBNP 211  f/u Card rec

## 2019-06-21 NOTE — DISCHARGE NOTE PROVIDER - HOSPITAL COURSE
77 y/o female with a PmHx of Asthma, Hypothyroidism, diastolic CHF, HTN, and HLD, presented to the [facility] ED c/o SOB and chest pain.  Pt reports intermittent pressure sensation in left chest for a week. HsT at admission neg, EKG nonspecific T wave changes TTE 6/21: LV EF 55-60%.  No wall motion abnormality. Grade I diastolic dysfunction.  Otherwise unremarkable.     f/u stress test     f/u Card recs.            Cough.  Plan: Pt had cough for 2 weeks     appreciate pulm recs    treat as COPD, pulm follow up as outpatient. 79 y/o female with a PmHx of Asthma, Hypothyroidism, diastolic CHF, HTN, and HLD, presented to the [facility] ED c/o SOB and chest pain.  Pt reports intermittent pressure sensation in left chest for a week. HsT at admission neg, EKG nonspecific T wave changes TTE 6/21: LV EF 55-60%.  No wall motion abnormality. Grade I diastolic dysfunction.  Otherwise unremarkable.     Stress test shows small, mild defect in proximal to mid inferolateral wall that is partially reversible,    suggestive of infarction with mild aamir-infarct ischemia. You underwent cardiac cath and recommended to continue daily Aspirin  and Lipitor as prescribed    Please follow up with cardiologist Dr Mcguire in 7-10 days        Cough for 2 weeks, CXR clear, evaluated by pulm, Continue spiriva and albuterol    follow up as outpatient. 79 y/o female with a PmHx of Asthma, Hypothyroidism, diastolic CHF, HTN, and HLD, presented to the [facility] ED c/o SOB and chest pain.  Pt reports intermittent pressure sensation in left chest for a week. HsT at admission neg, EKG nonspecific T wave changes TTE 6/21: LV EF 55-60%.  No wall motion abnormality. Grade I diastolic dysfunction.  Otherwise unremarkable.     Stress test shows small, mild defect in proximal to mid inferolateral wall that is partially reversible,    suggestive of infarction with mild aamir-infarct ischemia. Pt underwent cardiac cath showing non-obstructive CAD and recommended to continue daily Aspirin  and Lipitor as prescribed. Pt instructed to follow up with cardiologist Dr Mcguire in 7-10 days        Cough for 2 weeks, CXR clear, evaluated by pulm, thought be mild COPD exacerbation. Started on spiriva and continued symbicort, albuterol. Respiratory status improved.     follow up as outpatient.

## 2019-06-21 NOTE — PROVIDER CONTACT NOTE (OTHER) - SITUATION
As reported by tele tech, pt HR went into the mid 40's but then came back up to the mid-50's. 62 on monitor as of now. Patient was sleeping, asymptomatic.

## 2019-06-21 NOTE — DISCHARGE NOTE PROVIDER - NSDCCAREPROVSEEN_GEN_ALL_CORE_FT
[facility], TELE PA [facility] Pulmonary, Team  Jan Mcguire  [facility], ADS

## 2019-06-21 NOTE — DISCHARGE NOTE PROVIDER - CARE PROVIDER_API CALL
Jan Mcguire (MD)  Cardiovascular Disease; Nuclear Cardiology  [address] [place] [address]  Phone: [phone]  Fax: [phone]  Follow Up Time:

## 2019-06-21 NOTE — PROGRESS NOTE ADULT - PROBLEM SELECTOR PLAN 1
Pt reports intermittent pressure sensation in left chest for a week  HsT at admission neg   EKG nonspecific T wave changes   TTE 6/21: LV EF 55-60%.  No wall motion abnormality. Grade I diastolic dysfunction.  Otherwise unremarkable.   f/u stress test   appreciate Card recs Pt reports intermittent pressure sensation in left chest for a week  HsT at admission neg   EKG nonspecific T wave changes   TTE 6/21: LV EF 55-60%.  No wall motion abnormality. Grade I diastolic dysfunction.  Otherwise unremarkable.   On tele, sinus jamarcus, pt is asymptomatic.   f/u stress test   f/u Card recs

## 2019-06-21 NOTE — DISCHARGE NOTE PROVIDER - NSDCCPCAREPLAN_GEN_ALL_CORE_FT
PRINCIPAL DISCHARGE DIAGNOSIS  Diagnosis: Chest pain, atypical  Assessment and Plan of Treatment: Resolved over hospital stay. Stress test shows_______  Echo shows Ejection fraction of 55-60%  Please follow up with cardiologist____      SECONDARY DISCHARGE DIAGNOSES  Diagnosis: HTN (hypertension)  Assessment and Plan of Treatment: Continue blood pressure medication regimen as directed. Monitor for any visual changes, headaches or dizziness.  Monitor blood pressure regularly.  Follow up with your PCP for further management for high blood pressure. (hypertension)    Diagnosis: Hyperlipidemia  Assessment and Plan of Treatment: Continue medications.    Diagnosis: Hypothyroidism  Assessment and Plan of Treatment: Continue home medications.    Diagnosis: Cough  Assessment and Plan of Treatment: Cough for 2 weeks, likley stemming from COPD. Please follow up with your pulmonologist outpatient for further management. PRINCIPAL DISCHARGE DIAGNOSIS  Diagnosis: Chest pain, atypical  Assessment and Plan of Treatment: Resolved over hospital stay. Echo shows Ejection fraction of 55-60%  Stress test shows small, mild defect in proximal to mid inferolateral wall that is partially reversible,  suggestive of infarction with mild aamir-infarct ischemia. You underwent cardiac cath and recommended to continue daily Aspirin  and Lipitor as prescribed  Please follow up with cardiologist Dr Mcguire in 7-10 days      SECONDARY DISCHARGE DIAGNOSES  Diagnosis: Hypothyroidism  Assessment and Plan of Treatment: Continue daily Synthroid and follow up with PCP in 1 weeks    Diagnosis: Cough  Assessment and Plan of Treatment: Cough for 2 weeks, likley stemming from COPD.  Continue spiriva and albuterol  Please follow up with your pulmonologist outpatient for further management.    Diagnosis: Hyperlipidemia  Assessment and Plan of Treatment: Continue medications.    Diagnosis: HTN (hypertension)  Assessment and Plan of Treatment: Continue blood pressure medication regimen as directed. Monitor for any visual changes, headaches or dizziness.  Monitor blood pressure regularly.  Follow up with your PCP for further management for high blood pressure. (hypertension)

## 2019-06-22 PROCEDURE — 99233 SBSQ HOSP IP/OBS HIGH 50: CPT

## 2019-06-22 RX ORDER — ATORVASTATIN CALCIUM 80 MG/1
40 TABLET, FILM COATED ORAL AT BEDTIME
Refills: 0 | Status: DISCONTINUED | OUTPATIENT
Start: 2019-06-22 | End: 2019-06-24

## 2019-06-22 RX ADMIN — ATORVASTATIN CALCIUM 40 MILLIGRAM(S): 80 TABLET, FILM COATED ORAL at 22:09

## 2019-06-22 RX ADMIN — Medication 75 MICROGRAM(S): at 05:28

## 2019-06-22 RX ADMIN — TIOTROPIUM BROMIDE 1 CAPSULE(S): 18 CAPSULE ORAL; RESPIRATORY (INHALATION) at 10:13

## 2019-06-22 RX ADMIN — HEPARIN SODIUM 5000 UNIT(S): 5000 INJECTION INTRAVENOUS; SUBCUTANEOUS at 13:16

## 2019-06-22 RX ADMIN — Medication 81 MILLIGRAM(S): at 13:15

## 2019-06-22 RX ADMIN — HEPARIN SODIUM 5000 UNIT(S): 5000 INJECTION INTRAVENOUS; SUBCUTANEOUS at 22:08

## 2019-06-22 RX ADMIN — HEPARIN SODIUM 5000 UNIT(S): 5000 INJECTION INTRAVENOUS; SUBCUTANEOUS at 05:28

## 2019-06-22 RX ADMIN — PANTOPRAZOLE SODIUM 40 MILLIGRAM(S): 20 TABLET, DELAYED RELEASE ORAL at 05:28

## 2019-06-22 NOTE — PROGRESS NOTE ADULT - PROBLEM SELECTOR PLAN 5
[id number], continue statin patient with +stress tset, LDL elevated, will change from moderate intensity statin to high intensity statin. Lipitor 40mg.

## 2019-06-23 PROCEDURE — 99232 SBSQ HOSP IP/OBS MODERATE 35: CPT

## 2019-06-23 RX ADMIN — Medication 81 MILLIGRAM(S): at 11:50

## 2019-06-23 RX ADMIN — HEPARIN SODIUM 5000 UNIT(S): 5000 INJECTION INTRAVENOUS; SUBCUTANEOUS at 06:56

## 2019-06-23 RX ADMIN — HEPARIN SODIUM 5000 UNIT(S): 5000 INJECTION INTRAVENOUS; SUBCUTANEOUS at 23:11

## 2019-06-23 RX ADMIN — PANTOPRAZOLE SODIUM 40 MILLIGRAM(S): 20 TABLET, DELAYED RELEASE ORAL at 06:55

## 2019-06-23 RX ADMIN — Medication 75 MICROGRAM(S): at 06:55

## 2019-06-23 RX ADMIN — ATORVASTATIN CALCIUM 40 MILLIGRAM(S): 80 TABLET, FILM COATED ORAL at 23:11

## 2019-06-23 RX ADMIN — HEPARIN SODIUM 5000 UNIT(S): 5000 INJECTION INTRAVENOUS; SUBCUTANEOUS at 13:32

## 2019-06-23 RX ADMIN — TIOTROPIUM BROMIDE 1 CAPSULE(S): 18 CAPSULE ORAL; RESPIRATORY (INHALATION) at 10:16

## 2019-06-23 NOTE — PROGRESS NOTE ADULT - PROBLEM SELECTOR PLAN 5
patient with +stress tset, LDL elevated, will change from moderate intensity statin to high intensity statin. Lipitor 40mg.

## 2019-06-24 ENCOUNTER — TRANSCRIPTION ENCOUNTER (OUTPATIENT)
Age: 79
End: 2019-06-24

## 2019-06-24 VITALS
OXYGEN SATURATION: 99 % | RESPIRATION RATE: 16 BRPM | SYSTOLIC BLOOD PRESSURE: 138 MMHG | TEMPERATURE: 98 F | HEART RATE: 54 BPM | DIASTOLIC BLOOD PRESSURE: 67 MMHG

## 2019-06-24 LAB
ANION GAP SERPL CALC-SCNC: 10 MMO/L — SIGNIFICANT CHANGE UP (ref 7–14)
BUN SERPL-MCNC: 12 MG/DL — SIGNIFICANT CHANGE UP (ref 7–23)
CALCIUM SERPL-MCNC: 9.3 MG/DL — SIGNIFICANT CHANGE UP (ref 8.4–10.5)
CHLORIDE SERPL-SCNC: 104 MMOL/L — SIGNIFICANT CHANGE UP (ref 98–107)
CO2 SERPL-SCNC: 26 MMOL/L — SIGNIFICANT CHANGE UP (ref 22–31)
CREAT SERPL-MCNC: 0.65 MG/DL — SIGNIFICANT CHANGE UP (ref 0.5–1.3)
GLUCOSE SERPL-MCNC: 107 MG/DL — HIGH (ref 70–99)
HCT VFR BLD CALC: 43.7 % — SIGNIFICANT CHANGE UP (ref 34.5–45)
HGB BLD-MCNC: 14.1 G/DL — SIGNIFICANT CHANGE UP (ref 11.5–15.5)
INR BLD: 1.06 — SIGNIFICANT CHANGE UP (ref 0.88–1.17)
MAGNESIUM SERPL-MCNC: 2.3 MG/DL — SIGNIFICANT CHANGE UP (ref 1.6–2.6)
MCHC RBC-ENTMCNC: 27.9 PG — SIGNIFICANT CHANGE UP (ref 27–34)
MCHC RBC-ENTMCNC: 32.3 % — SIGNIFICANT CHANGE UP (ref 32–36)
MCV RBC AUTO: 86.5 FL — SIGNIFICANT CHANGE UP (ref 80–100)
NRBC # FLD: 0 K/UL — SIGNIFICANT CHANGE UP (ref 0–0)
PHOSPHATE SERPL-MCNC: 3.3 MG/DL — SIGNIFICANT CHANGE UP (ref 2.5–4.5)
PLATELET # BLD AUTO: 221 K/UL — SIGNIFICANT CHANGE UP (ref 150–400)
PMV BLD: 10.9 FL — SIGNIFICANT CHANGE UP (ref 7–13)
POTASSIUM SERPL-MCNC: 4.2 MMOL/L — SIGNIFICANT CHANGE UP (ref 3.5–5.3)
POTASSIUM SERPL-SCNC: 4.2 MMOL/L — SIGNIFICANT CHANGE UP (ref 3.5–5.3)
PROTHROM AB SERPL-ACNC: 12.1 SEC — SIGNIFICANT CHANGE UP (ref 9.8–13.1)
RBC # BLD: 5.05 M/UL — SIGNIFICANT CHANGE UP (ref 3.8–5.2)
RBC # FLD: 13.2 % — SIGNIFICANT CHANGE UP (ref 10.3–14.5)
SODIUM SERPL-SCNC: 140 MMOL/L — SIGNIFICANT CHANGE UP (ref 135–145)
WBC # BLD: 5.46 K/UL — SIGNIFICANT CHANGE UP (ref 3.8–10.5)
WBC # FLD AUTO: 5.46 K/UL — SIGNIFICANT CHANGE UP (ref 3.8–10.5)

## 2019-06-24 PROCEDURE — 99239 HOSP IP/OBS DSCHRG MGMT >30: CPT

## 2019-06-24 RX ORDER — TIOTROPIUM BROMIDE 18 UG/1
1 CAPSULE ORAL; RESPIRATORY (INHALATION)
Qty: 1 | Refills: 0
Start: 2019-06-24 | End: 2019-07-23

## 2019-06-24 RX ORDER — ASPIRIN/CALCIUM CARB/MAGNESIUM 324 MG
1 TABLET ORAL
Qty: 30 | Refills: 0
Start: 2019-06-24 | End: 2019-07-23

## 2019-06-24 RX ORDER — ALBUTEROL 90 UG/1
2 AEROSOL, METERED ORAL
Qty: 1 | Refills: 0
Start: 2019-06-24 | End: 2019-07-23

## 2019-06-24 RX ORDER — SIMVASTATIN 20 MG/1
1 TABLET, FILM COATED ORAL
Qty: 0 | Refills: 0 | DISCHARGE

## 2019-06-24 RX ORDER — ATORVASTATIN CALCIUM 80 MG/1
1 TABLET, FILM COATED ORAL
Qty: 0 | Refills: 0 | DISCHARGE
Start: 2019-06-24

## 2019-06-24 RX ORDER — PANTOPRAZOLE SODIUM 20 MG/1
1 TABLET, DELAYED RELEASE ORAL
Qty: 30 | Refills: 0
Start: 2019-06-24 | End: 2019-07-23

## 2019-06-24 RX ORDER — SODIUM CHLORIDE 9 MG/ML
1000 INJECTION INTRAMUSCULAR; INTRAVENOUS; SUBCUTANEOUS
Refills: 0 | Status: DISCONTINUED | OUTPATIENT
Start: 2019-06-24 | End: 2019-06-24

## 2019-06-24 RX ADMIN — Medication 75 MICROGRAM(S): at 05:35

## 2019-06-24 RX ADMIN — HEPARIN SODIUM 5000 UNIT(S): 5000 INJECTION INTRAVENOUS; SUBCUTANEOUS at 05:35

## 2019-06-24 NOTE — PROGRESS NOTE ADULT - PROBLEM SELECTOR PLAN 4
monitor BP, in acceptable range   DASH diet

## 2019-06-24 NOTE — DISCHARGE NOTE NURSING/CASE MANAGEMENT/SOCIAL WORK - NSDCDPATPORTLINK_GEN_ALL_CORE
You can access the [facility] Patient Portal, offered by [facility], by registering with the following website: [email or web address]

## 2019-06-24 NOTE — PROGRESS NOTE ADULT - PROBLEM SELECTOR PLAN 5
patient with +stress tset, LDL elevated, will change from moderate intensity statin to high intensity statin.   -Lipitor 40mg.

## 2019-06-24 NOTE — PROGRESS NOTE ADULT - PROBLEM SELECTOR PLAN 6
continue levothyroxine  TSH in normal range
continue levothyroxine  TSH in normal range
-continue levothyroxine  TSH in normal range
continue levothyroxine  TSH in normal range

## 2019-06-24 NOTE — PROGRESS NOTE ADULT - ATTENDING COMMENTS
d/w pt's  at bedside. All questions answered.
stable for discharge home today  total time spent on discharge 37min

## 2019-06-24 NOTE — PROGRESS NOTE ADULT - PROBLEM SELECTOR PLAN 3
Pt had cough for 2 weeks , improving per patietn.   appreciate pulm recs  treat as COPD, pulm follow up as outpatient
c/w mild COPD exacerbation   -appreciate pulm recs  -cont albuterol, Symbicort, added Spiriva   -cont hycodan prn for cough
Pt had cough for 2 weeks , improving per patietn.   appreciate pulm recs  treat as COPD, pulm follow up as outpatient
Pt had cough for 2 weeks   appreciate pulm recs  treat as COPD, pulm follow up as outpatient

## 2019-06-24 NOTE — PROGRESS NOTE ADULT - SUBJECTIVE AND OBJECTIVE BOX
Patient is a 78y old  Female who presents with a chief complaint of SOB and CP (24 Jun 2019 11:11)      SUBJECTIVE / OVERNIGHT EVENTS:    No acute event o/n. Pt reports her breathing has back to her baseline. Had LHC today tolerated procedure well    Review of Systems:    RESPIRATORY: No cough, wheezing, chills or hemoptysis; No shortness of breath  CARDIOVASCULAR: No chest pain, palpitations, dizziness, or leg swelling  GASTROINTESTINAL: No abdominal or epigastric pain. No nausea, vomiting, or hematemesis; No diarrhea or constipation. No melena or hematochezia.    MEDICATIONS  (STANDING):  aspirin enteric coated 81 milliGRAM(s) Oral daily  atorvastatin 40 milliGRAM(s) Oral at bedtime  heparin  Injectable 5000 Unit(s) SubCutaneous every 8 hours  levothyroxine 75 MICROGram(s) Oral daily  pantoprazole    Tablet 40 milliGRAM(s) Oral before breakfast  sodium chloride 0.9%. 1000 milliLiter(s) (100 mL/Hr) IV Continuous <Continuous>  tiotropium 18 MICROgram(s) Capsule 1 Capsule(s) Inhalation daily    MEDICATIONS  (PRN):  ALBUTerol    90 MICROgram(s) HFA Inhaler 2 Puff(s) Inhalation every 6 hours PRN Shortness of Breath and/or Wheezing  HYDROcodone/homatropine Syrup 5 milliLiter(s) Oral every 6 hours PRN Cough      PHYSICAL EXAM:  T(C): 36.6 (06-24-19 @ 05:35), Max: 36.8 (06-23-19 @ 14:50)  HR: 54 (06-24-19 @ 05:35) (54 - 57)  BP: 138/67 (06-24-19 @ 05:35) (116/55 - 138/67)  RR: 16 (06-24-19 @ 05:35) (16 - 17)  SpO2: 99% (06-24-19 @ 05:35) (94% - 99%)  I&O's Summary    GENERAL: middle age female lying in bed in NAD   MENTAL STATUS/PSYCH:  AAO x3   HEAD:  Atraumatic, Normocephalic  EYES: EOMI, PERRLA, conjunctiva and sclera clear  NECK: Supple, No elevated JVD  CHEST/LUNG: Clear to auscultation bilaterally; No wheeze  HEART: Regular rate and rhythm; No murmurs, rubs, or gallops  ABDOMEN: Soft, Nontender, Nondistended; Bowel sounds present  EXTREMITIES:  2+ Peripheral Pulses, No clubbing, cyanosis, or edema. R groin cath site no hematoma no bruit  NEUROLOGY: CN II-XII grossly intact, moving all extremities  SKIN: No rashes or lesions    LABS:  CAPILLARY BLOOD GLUCOSE                              14.1   5.46  )-----------( 221      ( 24 Jun 2019 06:00 )             43.7     06-24    140  |  104  |  12  ----------------------------<  107<H>  4.2   |  26  |  0.65    Ca    9.3      24 Jun 2019 06:00  Phos  3.3     06-24  Mg     2.3     06-24      PT/INR - ( 24 Jun 2019 06:00 )   PT: 12.1 SEC;   INR: 1.06                    RADIOLOGY & ADDITIONAL TESTS:    Imaging Personally Reviewed:    Consultant(s) Notes Reviewed:      Care Discussed with Consultants/Other Providers:
Jan Mcguire MD  Interventional Cardiology / Advance Heart Failure and Cardiac Transplant Specialist  [place] Office : [address] [place] [address]  Tel:   [place] Office : [address] [facility] [address] [address]  Tel: [phone]  Cell : [phone]    Subjective : Pt lying in bed comfortable, not in distress, denies any chest pain or SOB  	  MEDICATIONS:  aspirin enteric coated 81 milliGRAM(s) Oral daily  heparin  Injectable 5000 Unit(s) SubCutaneous every 8 hours      ALBUTerol    90 MICROgram(s) HFA Inhaler 2 Puff(s) Inhalation every 6 hours PRN  HYDROcodone/homatropine Syrup 5 milliLiter(s) Oral every 6 hours PRN  tiotropium 18 MICROgram(s) Capsule 1 Capsule(s) Inhalation daily      pantoprazole    Tablet 40 milliGRAM(s) Oral before breakfast    levothyroxine 75 MICROGram(s) Oral daily  simvastatin 20 milliGRAM(s) Oral at bedtime        PHYSICAL EXAM:  T(C): 37.3 (06-21-19 @ 13:47), Max: 37.3 (06-21-19 @ 13:47)  HR: 57 (06-21-19 @ 13:47) (51 - 68)  BP: 117/49 (06-21-19 @ 13:47) (117/49 - 145/78)  RR: 18 (06-21-19 @ 13:47) (17 - 18)  SpO2: 99% (06-21-19 @ 13:47) (94% - 99%)  Wt(kg): --  I&O's Summary    21 Jun 2019 07:01  -  21 Jun 2019 13:53  --------------------------------------------------------  IN: 250 mL / OUT: 250 mL / NET: 0 mL          Appearance: Normal	  HEENT:   Normal oral mucosa, PERRL, EOMI	  Cardiovascular: Normal S1 S2, No JVD, No murmurs, No edema  Respiratory: Lungs clear to auscultation	  Gastrointestinal:  Soft, Non-tender, + BS	  Extremities: Normal range of motion, No clubbing, cyanosis or edema                                    13.0   7.98  )-----------( 217      ( 21 Jun 2019 05:50 )             40.4     06-21    140  |  103  |  14  ----------------------------<  97  3.7   |  25  |  0.54    Ca    8.7      21 Jun 2019 05:50  Phos  3.3     06-21  Mg     2.3     06-21    TPro  6.8  /  Alb  3.8  /  TBili  0.8  /  DBili  x   /  AST  16  /  ALT  16  /  AlkPhos  62  06-21    proBNP:   Lipid Profile:   HgA1c:   TSH:
Jan Mcguire MD  Interventional Cardiology / Advance Heart Failure and Cardiac Transplant Specialist  [place] Office : [address] [place] [address]  Tel:   [place] Office : [address] [facility] [address] [address]  Tel: [phone]  Cell : [phone]    Subjective : Pt lying in bed comfortable, not in distress, denies any chest pain or SOB  	  MEDICATIONS:  aspirin enteric coated 81 milliGRAM(s) Oral daily  heparin  Injectable 5000 Unit(s) SubCutaneous every 8 hours      ALBUTerol    90 MICROgram(s) HFA Inhaler 2 Puff(s) Inhalation every 6 hours PRN  HYDROcodone/homatropine Syrup 5 milliLiter(s) Oral every 6 hours PRN  tiotropium 18 MICROgram(s) Capsule 1 Capsule(s) Inhalation daily      pantoprazole    Tablet 40 milliGRAM(s) Oral before breakfast    levothyroxine 75 MICROGram(s) Oral daily  simvastatin 20 milliGRAM(s) Oral at bedtime        PHYSICAL EXAM:  T(C): 36.6 (06-22-19 @ 14:03), Max: 37 (06-22-19 @ 05:23)  HR: 56 (06-22-19 @ 14:03) (55 - 57)  BP: 96/45 (06-22-19 @ 14:03) (96/45 - 132/58)  RR: 18 (06-22-19 @ 14:03) (17 - 18)  SpO2: 95% (06-22-19 @ 14:03) (95% - 96%)  Wt(kg): --  I&O's Summary    21 Jun 2019 07:01  -  22 Jun 2019 07:00  --------------------------------------------------------  IN: 950 mL / OUT: 750 mL / NET: 200 mL          Appearance: Normal	  HEENT:   Normal oral mucosa, PERRL, EOMI	  Cardiovascular: Normal S1 S2, No JVD, No murmurs, No edema  Respiratory: Lungs clear to auscultation	  Gastrointestinal:  Soft, Non-tender, + BS	  Extremities: Normal range of motion, No clubbing, cyanosis or edema                                    13.0   7.98  )-----------( 217      ( 21 Jun 2019 05:50 )             40.4     06-21    140  |  103  |  14  ----------------------------<  97  3.7   |  25  |  0.54    Ca    8.7      21 Jun 2019 05:50  Phos  3.3     06-21  Mg     2.3     06-21    TPro  6.8  /  Alb  3.8  /  TBili  0.8  /  DBili  x   /  AST  16  /  ALT  16  /  AlkPhos  62  06-21    proBNP:   Lipid Profile:   HgA1c:   TSH:
Jan Mcguire MD  Interventional Cardiology / Advance Heart Failure and Cardiac Transplant Specialist  [place] Office : [address] [place] [address]  Tel:   [place] Office : [address] [facility] [address] [address]  Tel: [phone]  Cell : [phone]    Subjective : Pt lying in bed comfortable, not in distress, denies any chest pain or SOB  	  MEDICATIONS:  aspirin enteric coated 81 milliGRAM(s) Oral daily  heparin  Injectable 5000 Unit(s) SubCutaneous every 8 hours      ALBUTerol    90 MICROgram(s) HFA Inhaler 2 Puff(s) Inhalation every 6 hours PRN  HYDROcodone/homatropine Syrup 5 milliLiter(s) Oral every 6 hours PRN  tiotropium 18 MICROgram(s) Capsule 1 Capsule(s) Inhalation daily      pantoprazole    Tablet 40 milliGRAM(s) Oral before breakfast    atorvastatin 40 milliGRAM(s) Oral at bedtime  levothyroxine 75 MICROGram(s) Oral daily    sodium chloride 0.9%. 1000 milliLiter(s) IV Continuous <Continuous>      PHYSICAL EXAM:  T(C): 36.6 (06-24-19 @ 05:35), Max: 36.8 (06-23-19 @ 14:50)  HR: 54 (06-24-19 @ 05:35) (54 - 57)  BP: 138/67 (06-24-19 @ 05:35) (116/55 - 138/67)  RR: 16 (06-24-19 @ 05:35) (16 - 17)  SpO2: 99% (06-24-19 @ 05:35) (94% - 99%)  Wt(kg): --  I&O's Summary        Appearance: Normal	  HEENT:   Normal oral mucosa, PERRL, EOMI	  Cardiovascular: Normal S1 S2, No JVD, No murmurs, No edema  Respiratory: Lungs clear to auscultation	  Gastrointestinal:  Soft, Non-tender, + BS	  Extremities: Normal range of motion, No clubbing, cyanosis or edema                                    14.1   5.46  )-----------( 221      ( 24 Jun 2019 06:00 )             43.7     06-24    140  |  104  |  12  ----------------------------<  107<H>  4.2   |  26  |  0.65    Ca    9.3      24 Jun 2019 06:00  Phos  3.3     06-24  Mg     2.3     06-24      proBNP:   Lipid Profile:   HgA1c:   TSH:
Jan Mcguire MD  Interventional Cardiology / Advance Heart Failure and Cardiac Transplant Specialist  [place] Office : [address] [place] [address]. [address]  Tel:   [place] Office : [address] [facility] [place] [address]  Tel: [phone]  Cell : [phone]    Subjective : Pt lying in bed comfortable, not in distress, denies any chest pain or SOB  	  MEDICATIONS:  aspirin enteric coated 81 milliGRAM(s) Oral daily  heparin  Injectable 5000 Unit(s) SubCutaneous every 8 hours      ALBUTerol    90 MICROgram(s) HFA Inhaler 2 Puff(s) Inhalation every 6 hours PRN  HYDROcodone/homatropine Syrup 5 milliLiter(s) Oral every 6 hours PRN  tiotropium 18 MICROgram(s) Capsule 1 Capsule(s) Inhalation daily      pantoprazole    Tablet 40 milliGRAM(s) Oral before breakfast    atorvastatin 40 milliGRAM(s) Oral at bedtime  levothyroxine 75 MICROGram(s) Oral daily        PHYSICAL EXAM:  T(C): 36.5 (06-23-19 @ 09:05), Max: 37 (06-22-19 @ 20:18)  HR: 69 (06-23-19 @ 09:05) (56 - 69)  BP: 129/56 (06-23-19 @ 09:05) (96/45 - 135/67)  RR: 18 (06-23-19 @ 09:05) (17 - 18)  SpO2: 96% (06-23-19 @ 09:05) (95% - 96%)  Wt(kg): --  I&O's Summary        Appearance: Normal	  HEENT:   Normal oral mucosa, PERRL, EOMI	  Cardiovascular: Normal S1 S2, No JVD, No murmurs, No edema  Respiratory: Lungs clear to auscultation	  Gastrointestinal:  Soft, Non-tender, + BS	  Extremities: Normal range of motion, No clubbing, cyanosis or edema                      proBNP:   Lipid Profile:   HgA1c:   TSH:
Patient is a 78y old  Female who presents with a chief complaint of SOB and CP (20 Jun 2019 15:43)      SUBJECTIVE / OVERNIGHT EVENTS:  No complaints. Respiratory status improved. No chest pain. For cath tomorrow.    MEDICATIONS  (STANDING):  aspirin enteric coated 81 milliGRAM(s) Oral daily  heparin  Injectable 5000 Unit(s) SubCutaneous every 8 hours  levothyroxine 75 MICROGram(s) Oral daily  pantoprazole    Tablet 40 milliGRAM(s) Oral before breakfast  simvastatin 20 milliGRAM(s) Oral at bedtime  tiotropium 18 MICROgram(s) Capsule 1 Capsule(s) Inhalation daily    MEDICATIONS  (PRN):  ALBUTerol    90 MICROgram(s) HFA Inhaler 2 Puff(s) Inhalation every 6 hours PRN Shortness of Breath and/or Wheezing  HYDROcodone/homatropine Syrup 5 milliLiter(s) Oral every 6 hours PRN Cough    Vital Signs Last 24 Hrs  T(C): 36.5 (23 Jun 2019 09:05), Max: 37 (22 Jun 2019 20:18)  T(F): 97.7 (23 Jun 2019 09:05), Max: 98.6 (22 Jun 2019 20:18)  HR: 69 (23 Jun 2019 09:05) (56 - 69)  BP: 129/56 (23 Jun 2019 09:05) (125/60 - 135/67)  BP(mean): --  RR: 18 (23 Jun 2019 09:05) (17 - 18)  SpO2: 96% (23 Jun 2019 09:05) (95% - 96%)          PHYSICAL EXAM:  GENERAL: NAD, obese  HEAD:  Atraumatic, Normocephalic  EYES: sclera clear  NECK: Supple, No JVD  CHEST/LUNG: Clear to auscultation bilaterally; No wheeze  HEART: s1 s2, regular rhythm and rate   ABDOMEN: Soft, Nontender, Nondistended; Bowel sounds present  EXTREMITIES:  2+ Peripheral Pulses, No clubbing, cyanosis, or edema  PSYCH: AAOx3, calm   NEUROLOGY: non-focal  SKIN: No rashes or lesions    LABS:                       RADIOLOGY & ADDITIONAL TESTS:    Imaging Personally Reviewed:  < from: Nuclear Stress Test-Pharmacologic (06.21.19 @ 09:47) >  IMPRESSIONS:Abnormal Study  * Myocardial Perfusion SPECT results are abnormal.  * Review of raw data shows: The study is of good technical  quality.  * There is a small, mild defect in proximal to mid  inferolateral wall that is partially reversible,  suggestive of infarction with mild aamir-infarct ischemia.  * Post-stress gated wall motion analysis was performed  (LVEF > 70%;LVEDV = 75 ml.), revealing normal LV function.  There is focal proximal to mid inferolateral diminished  systolic thickening. RV function appeared normal.  ADDENDUM 6/21/2019: STUDY RESULTS ARE NORMAL    < end of copied text >    Consultant(s) Notes Reviewed:      Care Discussed with Consultants/Other Providers:  Cardiology attending re: stress test results.
Patient is a 78y old  Female who presents with a chief complaint of SOB and CP (20 Jun 2019 15:43)      SUBJECTIVE / OVERNIGHT EVENTS:  Pt was interviewed via [facility]  #[id number]. She reports still having cough with whitish phlegm, slightly better, also intermittent pressure in left chest. no fever/chills/palpitation/dizziness/sob    MEDICATIONS  (STANDING):  aspirin enteric coated 81 milliGRAM(s) Oral daily  heparin  Injectable 5000 Unit(s) SubCutaneous every 8 hours  levothyroxine 75 MICROGram(s) Oral daily  pantoprazole    Tablet 40 milliGRAM(s) Oral before breakfast  simvastatin 20 milliGRAM(s) Oral at bedtime  tiotropium 18 MICROgram(s) Capsule 1 Capsule(s) Inhalation daily    MEDICATIONS  (PRN):  ALBUTerol    90 MICROgram(s) HFA Inhaler 2 Puff(s) Inhalation every 6 hours PRN Shortness of Breath and/or Wheezing  HYDROcodone/homatropine Syrup 5 milliLiter(s) Oral every 6 hours PRN Cough      T(C): 36.7 (06-21-19 @ 08:21), Max: 37 (06-20-19 @ 13:10)  HR: 68 (06-21-19 @ 08:21) (51 - 75)  BP: 145/78 (06-21-19 @ 08:21) (103/69 - 145/78)  RR: 18 (06-21-19 @ 08:21) (17 - 18)  SpO2: 95% (06-21-19 @ 08:21) (94% - 95%)  CAPILLARY BLOOD GLUCOSE        I&O's Summary    21 Jun 2019 07:01  -  21 Jun 2019 12:55  --------------------------------------------------------  IN: 250 mL / OUT: 250 mL / NET: 0 mL        PHYSICAL EXAM:  GENERAL: NAD, obese  HEAD:  Atraumatic, Normocephalic  EYES: sclera clear  NECK: Supple, No JVD  CHEST/LUNG: Clear to auscultation bilaterally; No wheeze  HEART: s1 s2, regular rhythm and rate   ABDOMEN: Soft, Nontender, Nondistended; Bowel sounds present  EXTREMITIES:  2+ Peripheral Pulses, No clubbing, cyanosis, or edema  PSYCH: AAOx3, calm   NEUROLOGY: non-focal  SKIN: No rashes or lesions    LABS:                        13.0   7.98  )-----------( 217      ( 21 Jun 2019 05:50 )             40.4     06-21    140  |  103  |  14  ----------------------------<  97  3.7   |  25  |  0.54    Ca    8.7      21 Jun 2019 05:50  Phos  3.3     06-21  Mg     2.3     06-21    TPro  6.8  /  Alb  3.8  /  TBili  0.8  /  DBili  x   /  AST  16  /  ALT  16  /  AlkPhos  62  06-21              RADIOLOGY & ADDITIONAL TESTS:    Imaging Personally Reviewed:    Consultant(s) Notes Reviewed:      Care Discussed with Consultants/Other Providers:
Patient is a 78y old  Female who presents with a chief complaint of SOB and CP (20 Jun 2019 15:43)      SUBJECTIVE / OVERNIGHT EVENTS:  No complaints. Respiratory status improved. No chest pain. Discussed +stress test and plan for catheterization on Monday, answered patient's questions. Patietn ambulated withtout chest pain or SOB after examination.     MEDICATIONS  (STANDING):  aspirin enteric coated 81 milliGRAM(s) Oral daily  heparin  Injectable 5000 Unit(s) SubCutaneous every 8 hours  levothyroxine 75 MICROGram(s) Oral daily  pantoprazole    Tablet 40 milliGRAM(s) Oral before breakfast  simvastatin 20 milliGRAM(s) Oral at bedtime  tiotropium 18 MICROgram(s) Capsule 1 Capsule(s) Inhalation daily    MEDICATIONS  (PRN):  ALBUTerol    90 MICROgram(s) HFA Inhaler 2 Puff(s) Inhalation every 6 hours PRN Shortness of Breath and/or Wheezing  HYDROcodone/homatropine Syrup 5 milliLiter(s) Oral every 6 hours PRN Cough    Vital Signs Last 24 Hrs  T(C): 36.6 (22 Jun 2019 14:03), Max: 37 (22 Jun 2019 05:23)  T(F): 97.8 (22 Jun 2019 14:03), Max: 98.6 (22 Jun 2019 05:23)  HR: 56 (22 Jun 2019 14:03) (55 - 57)  BP: 96/45 (22 Jun 2019 14:03) (96/45 - 132/58)  BP(mean): --  RR: 18 (22 Jun 2019 14:03) (17 - 18)  SpO2: 95% (22 Jun 2019 14:03) (95% - 96%)          PHYSICAL EXAM:  GENERAL: NAD, obese  HEAD:  Atraumatic, Normocephalic  EYES: sclera clear  NECK: Supple, No JVD  CHEST/LUNG: Clear to auscultation bilaterally; No wheeze  HEART: s1 s2, regular rhythm and rate   ABDOMEN: Soft, Nontender, Nondistended; Bowel sounds present  EXTREMITIES:  2+ Peripheral Pulses, No clubbing, cyanosis, or edema  PSYCH: AAOx3, calm   NEUROLOGY: non-focal  SKIN: No rashes or lesions    LABS:                        13.0   7.98  )-----------( 217      ( 21 Jun 2019 05:50 )             40.4   06-21    140  |  103  |  14  ----------------------------<  97  3.7   |  25  |  0.54    Ca    8.7      21 Jun 2019 05:50  Phos  3.3     06-21  Mg     2.3     06-21    TPro  6.8  /  Alb  3.8  /  TBili  0.8  /  DBili  x   /  AST  16  /  ALT  16  /  AlkPhos  62  06-21            RADIOLOGY & ADDITIONAL TESTS:    Imaging Personally Reviewed:  < from: Nuclear Stress Test-Pharmacologic (06.21.19 @ 09:47) >  IMPRESSIONS:Abnormal Study  * Myocardial Perfusion SPECT results are abnormal.  * Review of raw data shows: The study is of good technical  quality.  * There is a small, mild defect in proximal to mid  inferolateral wall that is partially reversible,  suggestive of infarction with mild aamir-infarct ischemia.  * Post-stress gated wall motion analysis was performed  (LVEF > 70%;LVEDV = 75 ml.), revealing normal LV function.  There is focal proximal to mid inferolateral diminished  systolic thickening. RV function appeared normal.  ADDENDUM 6/21/2019: STUDY RESULTS ARE NORMAL    < end of copied text >    Consultant(s) Notes Reviewed:      Care Discussed with Consultants/Other Providers:  Cardiology attending re: stress test results.

## 2019-06-24 NOTE — PROGRESS NOTE ADULT - ASSESSMENT
77 y/o female with a PmHx of Asthma/COPD, Hypothyroidism, diastolic CHF, HTN, and HLD, presented to the [facility] ED c/o SOB and atypical chest pain, admitted for COPD exacerbation
77 y/o female with a PmHx of Asthma, Hypothyroidism, diastolic CHF, HTN, and HLD, presented to the [facility] ED c/o SOB and chest pain.
EKG - NSR LV H T wave inversion v3 - v5    a/p     1) Chest pains - atypical, 2d echo shows normal LV stage 1 diastolic dysfunction ,  nuclear stress test + for mild ischemia Cath shows non obstructive CAD, can d/c home today    2) HLD - on zocor     3) DVT prophylasix - sc heparin
EKG - NSR LV H T wave inversion v3 - v5    a/p     1) Chest pains - atypical, 2d echo shows normal LV stage 1 diastolic dysfunction ,  nuclear stress test + for mild ischemia will cath on monday, cont asa    2) HLD - on zocor     3) DVT prophylasix - sc heparin
EKG - NSR LV H T wave inversion v3 - v5    a/p     1) Chest pains - atypical, 2d echo shows normal LV stage 1 diastolic dysfunction ,  nuclear stress test + for mild ischemia will cath tomorrow, cont asa    2) HLD - on zocor     3) DVT prophylasix - sc heparin
EKG - NSR LV H T wave inversion v3 - v5    a/p     1) Chest pains - atypical, stress test in september ok , 2d echo shows normal LV stage 1 diastolic dysfunction f/u nuclear stress test     2) HLD - on zocor     3) DVT prophylasix - sc heparin
77 y/o female with a PmHx of Asthma, Hypothyroidism, diastolic CHF, HTN, and HLD, presented to the [facility] ED c/o SOB and chest pain.
79 y/o female with a PmHx of Asthma, Hypothyroidism, diastolic CHF, HTN, and HLD, presented to the [facility] ED c/o SOB and chest pain.

## 2019-06-24 NOTE — PROGRESS NOTE ADULT - PROBLEM SELECTOR PLAN 1
pain resolved, but positive stress test.  -[facility] today non-obstructive CAD.   -continue current meds

## 2019-06-26 ENCOUNTER — EMERGENCY (EMERGENCY)
Facility: HOSPITAL | Age: 79
LOS: 1 days | Discharge: HOME | End: 2019-06-26
Attending: EMERGENCY MEDICINE | Admitting: EMERGENCY MEDICINE
Payer: MEDICARE

## 2019-06-26 VITALS
RESPIRATION RATE: 18 BRPM | SYSTOLIC BLOOD PRESSURE: 149 MMHG | DIASTOLIC BLOOD PRESSURE: 71 MMHG | TEMPERATURE: 98 F | OXYGEN SATURATION: 99 % | HEART RATE: 53 BPM

## 2019-06-26 VITALS
SYSTOLIC BLOOD PRESSURE: 160 MMHG | DIASTOLIC BLOOD PRESSURE: 93 MMHG | OXYGEN SATURATION: 99 % | HEART RATE: 64 BPM | TEMPERATURE: 98 F | RESPIRATION RATE: 16 BRPM

## 2019-06-26 DIAGNOSIS — Z98.890 OTHER SPECIFIED POSTPROCEDURAL STATES: Chronic | ICD-10-CM

## 2019-06-26 DIAGNOSIS — Z98.49 CATARACT EXTRACTION STATUS, UNSPECIFIED EYE: Chronic | ICD-10-CM

## 2019-06-26 LAB
ALBUMIN SERPL ELPH-MCNC: 4.3 G/DL — SIGNIFICANT CHANGE UP (ref 3.3–5)
ALP SERPL-CCNC: 80 U/L — SIGNIFICANT CHANGE UP (ref 40–120)
ALT FLD-CCNC: 41 U/L — HIGH (ref 4–33)
ANION GAP SERPL CALC-SCNC: 11 MMO/L — SIGNIFICANT CHANGE UP (ref 7–14)
APTT BLD: 22.4 SEC — LOW (ref 27.5–36.3)
AST SERPL-CCNC: 34 U/L — HIGH (ref 4–32)
BILIRUB SERPL-MCNC: 1.1 MG/DL — SIGNIFICANT CHANGE UP (ref 0.2–1.2)
BUN SERPL-MCNC: 15 MG/DL — SIGNIFICANT CHANGE UP (ref 7–23)
CALCIUM SERPL-MCNC: 9.6 MG/DL — SIGNIFICANT CHANGE UP (ref 8.4–10.5)
CHLORIDE SERPL-SCNC: 103 MMOL/L — SIGNIFICANT CHANGE UP (ref 98–107)
CO2 SERPL-SCNC: 25 MMOL/L — SIGNIFICANT CHANGE UP (ref 22–31)
CREAT SERPL-MCNC: 0.62 MG/DL — SIGNIFICANT CHANGE UP (ref 0.5–1.3)
GLUCOSE SERPL-MCNC: 105 MG/DL — HIGH (ref 70–99)
HCT VFR BLD CALC: 44.3 % — SIGNIFICANT CHANGE UP (ref 34.5–45)
HGB BLD-MCNC: 14.3 G/DL — SIGNIFICANT CHANGE UP (ref 11.5–15.5)
INR BLD: 0.99 — SIGNIFICANT CHANGE UP (ref 0.88–1.17)
MCHC RBC-ENTMCNC: 28.1 PG — SIGNIFICANT CHANGE UP (ref 27–34)
MCHC RBC-ENTMCNC: 32.3 % — SIGNIFICANT CHANGE UP (ref 32–36)
MCV RBC AUTO: 87.2 FL — SIGNIFICANT CHANGE UP (ref 80–100)
NRBC # FLD: 0 K/UL — SIGNIFICANT CHANGE UP (ref 0–0)
PLATELET # BLD AUTO: 181 K/UL — SIGNIFICANT CHANGE UP (ref 150–400)
PMV BLD: 11.4 FL — SIGNIFICANT CHANGE UP (ref 7–13)
POTASSIUM SERPL-MCNC: 4.5 MMOL/L — SIGNIFICANT CHANGE UP (ref 3.5–5.3)
POTASSIUM SERPL-SCNC: 4.5 MMOL/L — SIGNIFICANT CHANGE UP (ref 3.5–5.3)
PROT SERPL-MCNC: 7.5 G/DL — SIGNIFICANT CHANGE UP (ref 6–8.3)
PROTHROM AB SERPL-ACNC: 11.3 SEC — SIGNIFICANT CHANGE UP (ref 9.8–13.1)
RBC # BLD: 5.08 M/UL — SIGNIFICANT CHANGE UP (ref 3.8–5.2)
RBC # FLD: 13.3 % — SIGNIFICANT CHANGE UP (ref 10.3–14.5)
SODIUM SERPL-SCNC: 139 MMOL/L — SIGNIFICANT CHANGE UP (ref 135–145)
WBC # BLD: 8.5 K/UL — SIGNIFICANT CHANGE UP (ref 3.8–10.5)
WBC # FLD AUTO: 8.5 K/UL — SIGNIFICANT CHANGE UP (ref 3.8–10.5)

## 2019-06-26 PROCEDURE — 93971 EXTREMITY STUDY: CPT | Mod: 26,LT

## 2019-06-26 PROCEDURE — 99285 EMERGENCY DEPT VISIT HI MDM: CPT | Mod: GC

## 2019-06-26 RX ORDER — MORPHINE SULFATE 50 MG/1
4 CAPSULE, EXTENDED RELEASE ORAL ONCE
Refills: 0 | Status: DISCONTINUED | OUTPATIENT
Start: 2019-06-26 | End: 2019-06-26

## 2019-06-26 RX ADMIN — MORPHINE SULFATE 4 MILLIGRAM(S): 50 CAPSULE, EXTENDED RELEASE ORAL at 22:05

## 2019-06-26 NOTE — ED ADULT TRIAGE NOTE - NS ED NURSE BANDS TYPE
Tennille Sanchez, a 32 y.o. female presents to the ED c/o double ear infection.  Pt seen at urgent care and was dx with double ear infections, with meds symptoms are not improving in fact that are getting worse.   Pt ears are swollen and warm to the touch     Chief Complaint   Patient presents with    Otalgia     bilatearl ear pain , dx with ear infection last week, no impveoment with tx's.      Review of patient's allergies indicates:  No Known Allergies  Past Medical History:   Diagnosis Date    Vaginal fibroids      LOC: Patient name and date of birth verified. The patient is awake, alert and aware of environment with an appropriate affect, the patient is oriented x 3 and speaking appropriately.   APPEARANCE: Patient resting comfortably, patient is clean and well groomed, patient's clothing is properly fastened.  SKIN: The skin is warm and dry, color consistent with ethnicity, patient has normal skin turgor and moist mucus membranes, skin intact, no breakdown or bruising noted.  MUSCULOSKELETAL: Patient moving all extremities well, no obvious swelling or deformities noted.   HEENT: bilateral ear pain and swelling.  Redness and warmth to touch noted.  Ear exam positive for redness and swelling.       
Name band;

## 2019-06-26 NOTE — ED ADULT NURSE NOTE - OBJECTIVE STATEMENT
rec'd pt aaox3 in room 3 c/o L leg pain x 3 days, reports difficulty putting pressure on the leg and ambulating, states she was admitted at [facility] last week and had a cardiac catherization performed. Pt. states that pain started shortly after discharge. Denies CP/SOB. Lateral side of pt.'s L knee tender upon palpation, denies trauma/injury to the extremity. Vital signs stable. 22G IV saline lock placed in the R hand, labs drawn and sent as ordered. Pt. awaiting xray, US and dispo.

## 2019-06-26 NOTE — ED ADULT TRIAGE NOTE - CHIEF COMPLAINT QUOTE
Pt  d/c'd  from [facility] on Monday c/o L calf pain  since yesterday.  Denies chest pain, sob, dizziness. back pain

## 2019-06-26 NOTE — ED PROVIDER NOTE - PMH
Asthmatic bronchitis , chronic  cough x 5 years  Cervical spinal stenosis    Diastolic heart failure    HTN (hypertension)    Hyperlipidemia

## 2019-06-26 NOTE — ED PROVIDER NOTE - CARE PROVIDER_API CALL
Justin Katz)  Orthopedics  [address] [facility] [facility] [address] [facility], [address]  [place] [address]  Phone: [phone]  Fax: [phone]  Follow Up Time: 7-10 Days

## 2019-06-26 NOTE — ED PROVIDER NOTE - PROGRESS NOTE DETAILS
Gerald LOWE: Pt signed out to me.  She is feeling better with the pain medications and ACE wrap.  Ambulating with cane.  Stable for dc home and outpatient ortho follow-up.

## 2019-06-26 NOTE — ED PROVIDER NOTE - NSFOLLOWUPINSTRUCTIONS_ED_ALL_ED_FT
You were seen in the emergency department for knee pain.  You had an ultrasound that did not show a blood clot and x-rays that did not show any fractures.  You were provided an ACE bandage and cane for pain and ambulation.  Keep the extremity elevated when possible.  Rest and avoid strenuous activity.      Drink plenty of fluids.  You can take ibuprofen 400mg every 6 hours or Tylenol 650mg every 4 hours as needed for pain.  You were also prescribed percocet for any severe, breakthrough pain - take 1 tab every 8 hours as needed for severe pain.  Do not take with tylenol.  Do not drink alcohol or operate heavy machinery when taking this medication, as it can make you drowsy.  Follow-up with your PMD as needed.  Follow-up with the orthopedist in 1-2 weeks (see below for contact information to schedule your appointment).  Return to the emergency department for any new or worsening symptoms.

## 2019-06-26 NOTE — ED PROVIDER NOTE - ATTENDING CONTRIBUTION TO CARE
davin: just prior to her hospital discharge 2 days ago noted atraumatic onset of left lateral knee pain. pain worsened yesterday and has marked difficulty ambulating due to pain.   no other new sx. no hx similar pain.   exam: remarkable for pain and mild swelling approximating lateral lt knee joint. There is increased pain on palpation and on stressing the lateral knee. can flex knee voluntarily to approx 30 degrees.   impressionL likely lcl pathology with no hx trauma.   recc: xray; us doppler to evaluate for dvt. ortho consult.

## 2019-06-26 NOTE — ED ADULT NURSE REASSESSMENT NOTE - NS ED NURSE REASSESS COMMENT FT1
Received report from ERICKSON Myers, pt A&OX4, pt c/o pain in back of knee, pain 10/10 since yesterday. Morphine given by ERICKSON Reyes. Will continue to monitor.

## 2019-06-26 NOTE — ED PROVIDER NOTE - OBJECTIVE STATEMENT
78F with hx of HTN p/w left calf pain. 78F with hx of HTN p/w left calf pain. Patient states pain started monday and she hasn't been able to walk since. feels the calf is swollen. no CP, sob. went to urgent care and referred to ED for r/o dvt. recently admitted for CP and s/p cath w/o need for intervention.

## 2019-06-26 NOTE — ED PROVIDER NOTE - CONSULTING PHYSICIAN
3/21/2024              Roxanne Cuevas        [address] [facility] [address]         To whom it may concern,      The patient named above was seen in the office today.  Please excuse from school today, 3/21/24, and tomorrow, 3/22/24.      Sincerely,          Colleen Weiler, DO  [address] [id number] [phone]        Document electronically generated by:  Colleen Weiler, DO   
Ortho

## 2019-06-27 PROBLEM — M48.02 SPINAL STENOSIS, CERVICAL REGION: Chronic | Status: ACTIVE | Noted: 2019-06-20

## 2019-06-27 PROCEDURE — 73562 X-RAY EXAM OF KNEE 3: CPT | Mod: 26,LT

## 2019-06-27 RX ORDER — OXYCODONE AND ACETAMINOPHEN 5; 325 MG/1; MG/1
1 TABLET ORAL
Qty: 12 | Refills: 0
Start: 2019-06-27 | End: 2019-06-30

## 2019-06-27 RX ORDER — LIDOCAINE 4 G/100G
1 CREAM TOPICAL ONCE
Refills: 0 | Status: COMPLETED | OUTPATIENT
Start: 2019-06-27 | End: 2019-06-27

## 2019-06-27 RX ORDER — IBUPROFEN 200 MG
400 TABLET ORAL ONCE
Refills: 0 | Status: COMPLETED | OUTPATIENT
Start: 2019-06-27 | End: 2019-06-27

## 2019-06-27 RX ORDER — OXYCODONE AND ACETAMINOPHEN 5; 325 MG/1; MG/1
1 TABLET ORAL ONCE
Refills: 0 | Status: DISCONTINUED | OUTPATIENT
Start: 2019-06-27 | End: 2019-06-27

## 2019-06-27 RX ADMIN — OXYCODONE AND ACETAMINOPHEN 1 TABLET(S): 5; 325 TABLET ORAL at 01:34

## 2019-06-27 RX ADMIN — LIDOCAINE 1 PATCH: 4 CREAM TOPICAL at 01:34

## 2019-06-27 RX ADMIN — Medication 400 MILLIGRAM(S): at 01:34

## 2019-06-27 NOTE — CONSULT NOTE ADULT - SUBJECTIVE AND OBJECTIVE BOX
Orthopaedic Surgery Consult Note    Patient is a 78y old  Female who presents with a chief complaint of L knee pain which has become progressively worse over last 3 days. Denies any recent trauma. Localizes pain to the posterior-lateral portion of L knee. Denies fever/chills/N/V. Has not had any surgeries to the L knee. No numbness/tingling/weakness in LLE. No other bone/joint complaints.       PAST MEDICAL & SURGICAL HISTORY:  Asthmatic bronchitis , chronic: cough x 5 years  Cervical spinal stenosis  Hyperlipidemia  Diastolic heart failure  HTN (hypertension)  History of hip surgery: 2018  S/P cataract surgery: b/l 2018    [] No significant past history as reviewed with the patient and family    MEDICATIONS  (STANDING):    MEDICATIONS  (PRN):    Allergies    No Known Allergies    Intolerances        Vital Signs Last 24 Hrs  T(C): 36.8 (26 Jun 2019 22:12), Max: 36.9 (26 Jun 2019 19:37)  T(F): 98.2 (26 Jun 2019 22:12), Max: 98.4 (26 Jun 2019 19:37)  HR: 53 (26 Jun 2019 22:12) (53 - 64)  BP: 149/71 (26 Jun 2019 22:12) (149/71 - 160/93)  BP(mean): --  RR: 18 (26 Jun 2019 22:12) (16 - 18)  SpO2: 99% (26 Jun 2019 22:12) (99% - 99%)      PHYSICAL EXAM:  NAD  L knee: skin intact. TTP over posterior-lateral aspect of L knee. No palpable mass. No erythema or warmth of joint  Moderate joint effusion.  No joint line or patellar TTP  Negative valgus/varus stress, negative lachmans/posterior drawer/Mcmurrys  AROM 0-90 flex/ex w/ minimal pain    LLE:  5/5 TA/GS/EHL/FHL  +SILT DP/SP/T/S/S  WWP  Able to ambulate with moderate pain w/ assistance of awtkins                              14.3   8.50  )-----------( 181      ( 26 Jun 2019 22:00 )             44.3     06-26    139  |  103  |  15  ----------------------------<  105<H>  4.5   |  25  |  0.62    Ca    9.6      26 Jun 2019 22:00    TPro  7.5  /  Alb  4.3  /  TBili  1.1  /  DBili  x   /  AST  34<H>  /  ALT  41<H>  /  AlkPhos  80  06-26    PT/INR - ( 26 Jun 2019 22:00 )   PT: 11.3 SEC;   INR: 0.99          PTT - ( 26 Jun 2019 22:00 )  PTT:22.4 SEC      IMAGING STUDIES:  L knee XR: no fracture/dislocation, small calcification in posterior/lateral aspect of LLE, posterior to knee capsule. Joint space well preserved, minimal arthritic changes.    78y Female w/ x3 days acute atraumatic L knee pain. Xray showing calcification in posterior-lateral aspect of L knee, which correlates with patients location of pain. Likely calcific tendonitis vs bakers cyst.  -No acute orthopaedic surgical intervention  -WBAT  -Pain control/NSAIDs  - ACE wrap compression  - Follow up outpt with Dr. Juarez call [phone] for appointment

## 2019-07-05 PROBLEM — Z00.00 ENCOUNTER FOR PREVENTIVE HEALTH EXAMINATION: Status: ACTIVE | Noted: 2019-07-05

## 2019-07-19 ENCOUNTER — APPOINTMENT (OUTPATIENT)
Dept: HOME HEALTH SERVICES | Facility: HOME HEALTH | Age: 79
End: 2019-07-19

## 2019-09-20 ENCOUNTER — TRANSCRIPTION ENCOUNTER (OUTPATIENT)
Age: 79
End: 2019-09-20

## 2019-09-20 ENCOUNTER — EMERGENCY (EMERGENCY)
Facility: HOSPITAL | Age: 79
LOS: 1 days | Discharge: HOME | End: 2019-09-20
Attending: EMERGENCY MEDICINE | Admitting: EMERGENCY MEDICINE
Payer: MEDICARE

## 2019-09-20 VITALS
HEART RATE: 93 BPM | SYSTOLIC BLOOD PRESSURE: 153 MMHG | DIASTOLIC BLOOD PRESSURE: 71 MMHG | OXYGEN SATURATION: 96 % | RESPIRATION RATE: 20 BRPM | TEMPERATURE: 98 F

## 2019-09-20 VITALS
RESPIRATION RATE: 18 BRPM | TEMPERATURE: 98 F | OXYGEN SATURATION: 99 % | DIASTOLIC BLOOD PRESSURE: 76 MMHG | SYSTOLIC BLOOD PRESSURE: 180 MMHG | HEART RATE: 88 BPM

## 2019-09-20 DIAGNOSIS — Z98.890 OTHER SPECIFIED POSTPROCEDURAL STATES: Chronic | ICD-10-CM

## 2019-09-20 DIAGNOSIS — Z98.49 CATARACT EXTRACTION STATUS, UNSPECIFIED EYE: Chronic | ICD-10-CM

## 2019-09-20 LAB
ALBUMIN SERPL ELPH-MCNC: 4.5 G/DL — SIGNIFICANT CHANGE UP (ref 3.3–5)
ALP SERPL-CCNC: 88 U/L — SIGNIFICANT CHANGE UP (ref 40–120)
ALT FLD-CCNC: 18 U/L — SIGNIFICANT CHANGE UP (ref 4–33)
ANION GAP SERPL CALC-SCNC: 16 MMO/L — HIGH (ref 7–14)
AST SERPL-CCNC: 19 U/L — SIGNIFICANT CHANGE UP (ref 4–32)
BASOPHILS # BLD AUTO: 0.05 K/UL — SIGNIFICANT CHANGE UP (ref 0–0.2)
BASOPHILS NFR BLD AUTO: 0.5 % — SIGNIFICANT CHANGE UP (ref 0–2)
BILIRUB SERPL-MCNC: 1.7 MG/DL — HIGH (ref 0.2–1.2)
BUN SERPL-MCNC: 12 MG/DL — SIGNIFICANT CHANGE UP (ref 7–23)
CALCIUM SERPL-MCNC: 9.4 MG/DL — SIGNIFICANT CHANGE UP (ref 8.4–10.5)
CHLORIDE SERPL-SCNC: 101 MMOL/L — SIGNIFICANT CHANGE UP (ref 98–107)
CO2 SERPL-SCNC: 23 MMOL/L — SIGNIFICANT CHANGE UP (ref 22–31)
CREAT SERPL-MCNC: 0.65 MG/DL — SIGNIFICANT CHANGE UP (ref 0.5–1.3)
EOSINOPHIL # BLD AUTO: 0.02 K/UL — SIGNIFICANT CHANGE UP (ref 0–0.5)
EOSINOPHIL NFR BLD AUTO: 0.2 % — SIGNIFICANT CHANGE UP (ref 0–6)
GLUCOSE SERPL-MCNC: 119 MG/DL — HIGH (ref 70–99)
HCT VFR BLD CALC: 41.7 % — SIGNIFICANT CHANGE UP (ref 34.5–45)
HGB BLD-MCNC: 13.8 G/DL — SIGNIFICANT CHANGE UP (ref 11.5–15.5)
IMM GRANULOCYTES NFR BLD AUTO: 0.5 % — SIGNIFICANT CHANGE UP (ref 0–1.5)
LYMPHOCYTES # BLD AUTO: 1.41 K/UL — SIGNIFICANT CHANGE UP (ref 1–3.3)
LYMPHOCYTES # BLD AUTO: 12.7 % — LOW (ref 13–44)
MCHC RBC-ENTMCNC: 28.5 PG — SIGNIFICANT CHANGE UP (ref 27–34)
MCHC RBC-ENTMCNC: 33.1 % — SIGNIFICANT CHANGE UP (ref 32–36)
MCV RBC AUTO: 86 FL — SIGNIFICANT CHANGE UP (ref 80–100)
MONOCYTES # BLD AUTO: 0.72 K/UL — SIGNIFICANT CHANGE UP (ref 0–0.9)
MONOCYTES NFR BLD AUTO: 6.5 % — SIGNIFICANT CHANGE UP (ref 2–14)
NEUTROPHILS # BLD AUTO: 8.81 K/UL — HIGH (ref 1.8–7.4)
NEUTROPHILS NFR BLD AUTO: 79.6 % — HIGH (ref 43–77)
NRBC # FLD: 0 K/UL — SIGNIFICANT CHANGE UP (ref 0–0)
PLATELET # BLD AUTO: 204 K/UL — SIGNIFICANT CHANGE UP (ref 150–400)
PMV BLD: 11.7 FL — SIGNIFICANT CHANGE UP (ref 7–13)
POTASSIUM SERPL-MCNC: 4 MMOL/L — SIGNIFICANT CHANGE UP (ref 3.5–5.3)
POTASSIUM SERPL-SCNC: 4 MMOL/L — SIGNIFICANT CHANGE UP (ref 3.5–5.3)
PROT SERPL-MCNC: 7.9 G/DL — SIGNIFICANT CHANGE UP (ref 6–8.3)
RBC # BLD: 4.85 M/UL — SIGNIFICANT CHANGE UP (ref 3.8–5.2)
RBC # FLD: 13.2 % — SIGNIFICANT CHANGE UP (ref 10.3–14.5)
SODIUM SERPL-SCNC: 140 MMOL/L — SIGNIFICANT CHANGE UP (ref 135–145)
WBC # BLD: 11.06 K/UL — HIGH (ref 3.8–10.5)
WBC # FLD AUTO: 11.06 K/UL — HIGH (ref 3.8–10.5)

## 2019-09-20 PROCEDURE — 71046 X-RAY EXAM CHEST 2 VIEWS: CPT | Mod: 26

## 2019-09-20 PROCEDURE — 99284 EMERGENCY DEPT VISIT MOD MDM: CPT | Mod: 25

## 2019-09-20 PROCEDURE — 93010 ELECTROCARDIOGRAM REPORT: CPT

## 2019-09-20 RX ORDER — ALBUTEROL 90 UG/1
2.5 AEROSOL, METERED ORAL ONCE
Refills: 0 | Status: COMPLETED | OUTPATIENT
Start: 2019-09-20 | End: 2019-09-20

## 2019-09-20 RX ORDER — IPRATROPIUM/ALBUTEROL SULFATE 18-103MCG
3 AEROSOL WITH ADAPTER (GRAM) INHALATION ONCE
Refills: 0 | Status: COMPLETED | OUTPATIENT
Start: 2019-09-20 | End: 2019-09-20

## 2019-09-20 RX ORDER — SODIUM CHLORIDE 9 MG/ML
1000 INJECTION INTRAMUSCULAR; INTRAVENOUS; SUBCUTANEOUS ONCE
Refills: 0 | Status: COMPLETED | OUTPATIENT
Start: 2019-09-20 | End: 2019-09-20

## 2019-09-20 RX ORDER — BENZOCAINE AND MENTHOL 5; 1 G/100ML; G/100ML
1 LIQUID ORAL ONCE
Refills: 0 | Status: COMPLETED | OUTPATIENT
Start: 2019-09-20 | End: 2019-09-20

## 2019-09-20 RX ORDER — MAGNESIUM SULFATE 500 MG/ML
1 VIAL (ML) INJECTION ONCE
Refills: 0 | Status: COMPLETED | OUTPATIENT
Start: 2019-09-20 | End: 2019-09-20

## 2019-09-20 RX ORDER — ALBUTEROL 90 UG/1
1 AEROSOL, METERED ORAL ONCE
Refills: 0 | Status: COMPLETED | OUTPATIENT
Start: 2019-09-20 | End: 2019-09-20

## 2019-09-20 RX ORDER — IPRATROPIUM/ALBUTEROL SULFATE 18-103MCG
3 AEROSOL WITH ADAPTER (GRAM) INHALATION ONCE
Refills: 0 | Status: DISCONTINUED | OUTPATIENT
Start: 2019-09-20 | End: 2019-09-20

## 2019-09-20 RX ADMIN — Medication 3 MILLILITER(S): at 18:06

## 2019-09-20 RX ADMIN — ALBUTEROL 1 PUFF(S): 90 AEROSOL, METERED ORAL at 23:25

## 2019-09-20 RX ADMIN — Medication 125 MILLIGRAM(S): at 20:31

## 2019-09-20 RX ADMIN — ALBUTEROL 2.5 MILLIGRAM(S): 90 AEROSOL, METERED ORAL at 20:32

## 2019-09-20 RX ADMIN — Medication 3 MILLILITER(S): at 18:38

## 2019-09-20 RX ADMIN — Medication 100 GRAM(S): at 20:32

## 2019-09-20 RX ADMIN — Medication 100 MILLIGRAM(S): at 21:47

## 2019-09-20 RX ADMIN — BENZOCAINE AND MENTHOL 1 LOZENGE: 5; 1 LIQUID ORAL at 21:47

## 2019-09-20 RX ADMIN — SODIUM CHLORIDE 1000 MILLILITER(S): 9 INJECTION INTRAMUSCULAR; INTRAVENOUS; SUBCUTANEOUS at 18:07

## 2019-09-20 NOTE — ED PROVIDER NOTE - ATTENDING CONTRIBUTION TO CARE
ED Attending (Dr Stanford): I have personally performed a face to face bedside history and physical examination of this patient.  I have discussed the case with the PA and  I have personally authored the following components: HPI, ROS, Physical Exam and MDM in addition to reviewing and revising the rest of the Provider Note. 77 yo F, with PMH of HTN, HLD, suspected asthma, bronchitis, chronic cough, non-obstructive CAD, mild diastolic dysfunction, presents to ER c/o productive cough and sob this morning. Is wheezing with diminished air entry.  concern for asthma exacerbation, pneumonia. Plan: CXR, duoneb, solu-medrol, IVF, labs, and reassess.

## 2019-09-20 NOTE — ED PROVIDER NOTE - PATIENT PORTAL LINK FT
You can access the FollowMyHealth Patient Portal offered by [facility] by registering at the following website: [email or web address]. By joining [employer]’s FollowMyHealth portal, you will also be able to view your health information using other applications (apps) compatible with our system.

## 2019-09-20 NOTE — ED PROVIDER NOTE - OBJECTIVE STATEMENT
79 yo F, with PMH of HTN, HLD, asthma (never intubated) bronchitis, chronic cough, non-obstructive CAD, mild diastolic dysfunction, presents to ER c/o productive cough and sob this morning. Reports coughing up white sputum, difficulty with breathing. Admits she lost her voice today. Denies any fever, chills, dizziness, neck pain, throat pain, drooling, chest pain, back pain, abdominal pain, dysuria, recent travel, sick contact or any other complaints. 77 yo F, with PMH of HTN, HLD, asthma (never intubated) bronchitis, chronic cough, non-obstructive CAD, mild diastolic heart dysfunction, presents to ER c/o productive cough and sob this morning. Reports coughing up white sputum, difficulty with breathing. Admits she lost her voice today. Denies any fever, chills, dizziness, neck pain, throat pain, drooling, chest pain, back pain, abdominal pain, dysuria, recent travel, sick contact or any other complaints. 77 yo F, with PMH of HTN, HLD, possible asthma (has never had PFT) bronchitis, chronic cough, non-obstructive CAD, mild diastolic heart dysfunction, presents to ER c/o productive cough and sob this morning. Reports coughing up white sputum, difficulty with breathing. Admits she lost her voice today. Denies any fever, chills, dizziness, neck pain, throat pain, drooling, chest pain, back pain, abdominal pain, dysuria, recent travel, sick contact or any other complaints.  Appears SOB, speaking in very short sentences. Tachypneic.

## 2019-09-20 NOTE — ED PROVIDER NOTE - CLINICAL SUMMARY MEDICAL DECISION MAKING FREE TEXT BOX
77 yo F, with PMH of HTN, HLD, asthma (never intubated) bronchitis, chronic cough, non-obstructive CAD, mild diastolic dysfunction, presents to ER c/o productive cough and sob this morning. concern for asthma exacerbation, pneumonia. Plan: CXR, duoneb, solu-medrol, IVF, labs, and reassess. 77 yo F, with PMH of HTN, HLD, suspected asthma, bronchitis, chronic cough, non-obstructive CAD, mild diastolic dysfunction, presents to ER c/o productive cough and sob this morning. Is wheezing with diminished air entry.  concern for asthma exacerbation, pneumonia. Plan: CXR, duoneb, solu-medrol, IVF, labs, and reassess.

## 2019-09-20 NOTE — ED ADULT TRIAGE NOTE - CHIEF COMPLAINT QUOTE
Pt co cough , sore throat and congestion with sob since last night.  Pt is afebrile lungs clear on auscultation. Pt with HX of  bronchitis . pt speaking in soft tone.

## 2019-09-20 NOTE — ED PROVIDER NOTE - CONSTITUTIONAL, MLM
normal... well nourished, awake, alert, oriented to person, place, time/situation and in mild sob, lost of voice

## 2019-09-20 NOTE — ED ADULT NURSE REASSESSMENT NOTE - NS ED NURSE REASSESS COMMENT FT1
Pt Aox3, breathing even and unlabored b/l. Pt appears in NAD. Pt medicated as ordered, pt EKG done and placed in chart. Pt to be DC at this time,  at bedside, will continue to monitor.

## 2019-09-20 NOTE — ED PROVIDER NOTE - PROGRESS NOTE DETAILS
PA DEAN: Patient reassessed, sitting comfortably in bed in NAD, denies any complaints. States feeling better, symptoms improved. Speaking in full sentences. Exam: no wheezing noted. CECILE MORAN: Pt evaluated by CDU CECILE Westfall, states patient felt better, no more wheezing, patient doesn't want to stay anymore. Discussed with Dr. Saenz, Pt is medically stable for discharge and follow up with PMD and pulmonary. The patient was given verbal and written discharge instructions. Specifically, instructions when to return to the ED and when to seek follow-up from their pcp was discussed. Any specialty follow-up was discussed, including how to make an appointment.  Instructions were discussed in simple, plain language and was understood by the patient. The patient understands that should their symptoms worsen or any new symptoms arise, they should return to the ED immediately for further evaluation. All pt's questions were answered. Patient verbalizes understanding. CECILE MORAN: Pt evaluated by CDU CECILE Westfall, states patient felt better, no more wheezing, patient doesn't want to stay anymore. Discussed with Dr. Douglass, Pt is medically stable for discharge and follow up with PMD and pulmonary. The patient was given verbal and written discharge instructions. Specifically, instructions when to return to the ED and when to seek follow-up from their pcp was discussed. Any specialty follow-up was discussed, including how to make an appointment.  Instructions were discussed in simple, plain language and was understood by the patient. The patient understands that should their symptoms worsen or any new symptoms arise, they should return to the ED immediately for further evaluation. All pt's questions were answered. Patient verbalizes understanding, refused Syrian translation.

## 2019-11-30 NOTE — PROGRESS NOTE ADULT - PROBLEM SELECTOR PLAN 1
pain resolved, but positive stress test.  -for catheterization on Monday.  -continue current meds English

## 2019-12-16 NOTE — ED ADULT NURSE NOTE - NS ED NOTE  TALK SOMEONE YN
Post-Care Instructions: I reviewed with the patient in detail post-care instructions. Patient is to wear sunprotection, and avoid picking at any of the treated lesions. Pt may apply Vaseline to crusted or scabbing areas. Duration Of Freeze Thaw-Cycle (Seconds): 0 Render Note In Bullet Format When Appropriate: No Consent: The patient's consent was obtained including but not limited to risks of crusting, scabbing, blistering, scarring, darker or lighter pigmentary change, recurrence, incomplete removal and infection. Detail Level: Detailed No

## 2019-12-20 NOTE — ED PROVIDER NOTE - CHPI ED SYMPTOMS NEG
What Type Of Note Output Would You Prefer (Optional)?: Bullet Format
Hpi Title: Evaluation of Skin Lesions
How Severe Are Your Spot(S)?: mild
Family Member: Dad
no fever/no chills

## 2022-01-01 NOTE — DISCHARGE NOTE NURSING/CASE MANAGEMENT/SOCIAL WORK - NSDCPEPT PROEDHF_GEN_ALL_CORE
Low salt diet/Report signs and symptoms to primary care provider/Activities as tolerated/Monitor weight daily/Call primary care provider for follow up after discharge 3

## 2022-08-25 NOTE — PATIENT PROFILE ADULT. - TEACHING/LEARNING LEARNING PREFERENCES
[facility]  Emergency Department Encounter  Emergency Medicine Resident     Pt Macy Zaidi  MRN: [id number]  [place] 1996  Date of evaluation: 8/24/22  PCP:  Fadi Cast MD      [address] [place] [address]       Chief Complaint   Patient presents with    Pelvic Pain    Back Pain    Fever       HISTORY OF PRESENT ILLNESS  (Location/Symptom, Timing/Onset, Context/Setting, Quality, Duration, Modifying Factors, Severity.)      Eulalio Enamorado is a 23 y.o. female who presents with pelvic pain radiating into the back. Patient states that she first noticed this pain a couple of days ago, but has gotten worse and is now accompanied with fever and chills. The patient denies any dysuria, hematuria, altered bowel habitus, bloody stools, vaginal bleeding, or new vaginal discharge. Patient states that she has not been sexually active in the past month. Patient is not on any birth controls and LMP ended on 08/22. She denies any ongoing medical conditions and denies any allergies. PAST MEDICAL / SURGICAL / SOCIAL / FAMILY HISTORY      has a past medical history of Anxiety, Depression, HSV-2 infection, LGSIL of cervix of undetermined significance, LGSIL of cervix of undetermined significance, Pelvic inflammatory disease, and Psychiatric problem. has a past surgical history that includes fracture surgery (Left); Tympanostomy tube placement; Adenoidectomy; and Appendectomy.       Social History     Socioeconomic History    Marital status: Single     Spouse name: Not on file    Number of children: 2    Years of education: Not on file    Highest education level: Not on file   Occupational History    Occupation: [employer]   Tobacco Use    Smoking status: Never    Smokeless tobacco: Never   Vaping Use    Vaping Use: Never used   Substance and Sexual Activity    Alcohol use: Yes     Comment: socially    Drug use: No    Sexual activity: Yes     Partners: Male   Other Topics Concern    Not on file Social History Narrative    Not on file     Social Determinants of Health     Financial Resource Strain: Not on file   Food Insecurity: Not on file   Transportation Needs: Not on file   Physical Activity: Not on file   Stress: Not on file   Social Connections: Not on file   Intimate Partner Violence: Not on file   Housing Stability: Not on file       Family History   Problem Relation Age of Onset    No Known Problems Father     No Known Problems Mother        Allergies:  Patient has no known allergies. Home Medications:  Prior to Admission medications    Medication Sig Start Date End Date Taking? Authorizing Provider   doxycycline hyclate (VIBRA-TABS) 100 MG tablet Take 1 tablet by mouth 2 times daily for 14 days 8/25/22 9/8/22 Yes Nadia Dear, DO   metroNIDAZOLE (FLAGYL) 500 MG tablet Take 1 tablet by mouth 2 times daily for 14 days 8/25/22 9/8/22 Yes Nadia Dear, DO   ibuprofen (ADVIL;MOTRIN) 600 MG tablet Take 1 tablet by mouth every 6 hours as needed for Pain 8/25/22  Yes Nadia Dear, DO   oxyCODONE (ROXICODONE) 5 MG immediate release tablet Take 1 tablet by mouth every 6 hours as needed for Pain for up to 3 days. Intended supply: 3 days. Take lowest dose possible to manage pain 8/25/22 8/28/22 Yes Nadia Dear, DO   ondansetron (ZOFRAN) 4 MG tablet Take 1 tablet by mouth 3 times daily as needed for Nausea or Vomiting 8/25/22  Yes Nadia Dear, DO   cyclobenzaprine (FLEXERIL) 10 MG tablet Take 1 tablet by mouth nightly as needed for Muscle spasms 8/22/22 9/1/22  Sebastián White,    cefdinir (OMNICEF) 300 MG capsule TAKE ONE CAPSULE BY MOUTH TWICE DAILY 3/21/22   Historical Provider, MD   phenazopyridine (PYRIDIUM) 100 MG tablet TAKE TWO TABLETS BY MOUTH THREE TIMES DAILY AS NEEDED 3/18/22   Historical Provider, MD   gabapentin (NEURONTIN) 300 MG capsule Take 2 capsules by mouth 3 times daily for 30 days.  1/26/22 3/7/22  Rosetta Martinez MD   amitriptyline (ELAVIL) 25 MG tablet Take 1 tablet by mouth nightly 1/26/22   Tres Bell MD   polyethylene glycol (GLYCOLAX) 17 g packet Take 17 g by mouth daily as needed for Constipation  Patient not taking: Reported on 2/10/2022 1/26/22   Tres Bell MD       REVIEW OF SYSTEMS    (2-9 systems for level 4, 10 or more for level 5)      Review of Systems   Constitutional:  Positive for chills and fever. Eyes:  Negative for photophobia and visual disturbance. Cardiovascular:  Negative for chest pain and palpitations. Gastrointestinal:  Positive for abdominal pain. Negative for blood in stool, constipation, diarrhea, nausea and vomiting. Genitourinary:  Positive for pelvic pain. Negative for dyspareunia, dysuria, flank pain, hematuria, menstrual problem and vaginal bleeding. Skin:  Negative for rash and wound. Neurological:  Positive for headaches. Negative for syncope. Hematological:  Does not bruise/bleed easily. PHYSICAL EXAM   (up to 7 for level 4, 8 or more for level 5)      INITIAL VITALS:   /76   Pulse 91   Temp 98 °F (36.7 °C) (Oral)   Resp 22   Ht 5' 4\" (1.626 m)   Wt 190 lb (86.2 kg)   LMP 08/17/2022 (Exact Date)   SpO2 98%   BMI 32.61 kg/m²     Physical Exam  Constitutional:       General: She is not in acute distress. Cardiovascular:      Rate and Rhythm: Regular rhythm. Tachycardia present. Pulses: Normal pulses. Heart sounds: Normal heart sounds. Pulmonary:      Effort: Pulmonary effort is normal.      Breath sounds: Normal breath sounds. Abdominal:      General: There is distension. Tenderness: There is abdominal tenderness in the right lower quadrant, suprapubic area and left lower quadrant. There is right CVA tenderness and left CVA tenderness. There is no guarding or rebound. Musculoskeletal:         General: No tenderness, deformity or signs of injury. Right lower leg: No edema. Left lower leg: No edema. Skin:     General: Skin is warm and dry.       Capillary Refill: Capillary refill takes less than 2 seconds. Coloration: Skin is not jaundiced or pale. Findings: No erythema. Neurological:      Mental Status: She is alert and oriented to person, place, and time. Sensory: No sensory deficit. Motor: No weakness.        DIFFERENTIAL  DIAGNOSIS     PLAN (LABS / IMAGING / EKG):  Orders Placed This Encounter   Procedures    Culture, Blood 1    Culture, Blood 1    Culture, Urine    COVID-19, Rapid    XR CHEST PORTABLE    CBC with Auto Differential    Comprehensive Metabolic Panel    Lactate, Sepsis    Urinalysis with Microscopic    TSH with Reflex    CBC with Auto Differential    Inpatient consult to Obstetrics / Gynecology    Consult to Internal Medicine    EKG 12 Lead    ADMIT TO INPATIENT    ADMIT TO INPATIENT       MEDICATIONS ORDERED:  Orders Placed This Encounter   Medications    acetaminophen (TYLENOL) tablet 650 mg    0.9 % sodium chloride bolus    ketorolac (TORADOL) injection 30 mg    fentaNYL (SUBLIMAZE) injection 25 mcg    DISCONTD: cefTRIAXone (ROCEPHIN) 500 mg in dextrose 5 % 50 mL IVPB     Order Specific Question:   Antimicrobial Indications     Answer:   STD infection    cefTRIAXone (ROCEPHIN) 1,000 mg in sodium chloride 0.9 % 50 mL IVPB mini-bag     Order Specific Question:   Antimicrobial Indications     Answer:   STD infection    doxycycline hyclate (VIBRA-TABS) tablet 100 mg     Order Specific Question:   Antimicrobial Indications     Answer:   STD infection    DISCONTD: amitriptyline (ELAVIL) tablet 25 mg    DISCONTD: 0.9 % sodium chloride infusion    DISCONTD: sodium chloride flush 0.9 % injection 5-40 mL    DISCONTD: 0.9 % sodium chloride infusion    DISCONTD: ondansetron (ZOFRAN-ODT) disintegrating tablet 4 mg    DISCONTD: ondansetron (ZOFRAN) injection 4 mg    DISCONTD: 0.9 % sodium chloride bolus    DISCONTD: acetaminophen (TYLENOL) tablet 1,000 mg    DISCONTD: ketorolac (TORADOL) injection 30 mg    DISCONTD: ibuprofen (ADVIL;MOTRIN) tablet 600 mg    DISCONTD: oxyCODONE (ROXICODONE) immediate release tablet 5 mg    DISCONTD: oxyCODONE (ROXICODONE) immediate release tablet 10 mg    DISCONTD: cyclobenzaprine (FLEXERIL) tablet 10 mg    DISCONTD: metroNIDAZOLE (FLAGYL) tablet 500 mg     Order Specific Question:   Antimicrobial Indications     Answer:   STD infection    DISCONTD: cefTRIAXone (ROCEPHIN) 1,000 mg in sodium chloride 0.9 % 50 mL IVPB mini-bag     Order Specific Question:   Antimicrobial Indications     Answer:   STD infection    DISCONTD: doxycycline hyclate (VIBRA-TABS) tablet 100 mg     Order Specific Question:   Antimicrobial Indications     Answer:   STD infection    DISCONTD: benzocaine-menthol (CEPACOL SORE THROAT) lozenge 1 lozenge    DISCONTD: benzonatate (TESSALON) capsule 100 mg    DISCONTD: guaiFENesin (MUCINEX) extended release tablet 600 mg    DISCONTD: diphenhydrAMINE (BENADRYL) tablet 25 mg    DISCONTD: melatonin tablet 3 mg    DISCONTD: famotidine (PEPCID) 20 mg in sodium chloride (PF) 10 mL injection    DISCONTD: calcium carbonate (TUMS) chewable tablet 500 mg    DISCONTD: 0.9 % sodium chloride bolus    DISCONTD: amitriptyline (ELAVIL) tablet 25 mg    DISCONTD: gabapentin (NEURONTIN) capsule 300 mg    0.9 % sodium chloride bolus    doxycycline hyclate (VIBRA-TABS) 100 MG tablet     Sig: Take 1 tablet by mouth 2 times daily for 14 days     Dispense:  28 tablet     Refill:  0    metroNIDAZOLE (FLAGYL) 500 MG tablet     Sig: Take 1 tablet by mouth 2 times daily for 14 days     Dispense:  28 tablet     Refill:  0    ibuprofen (ADVIL;MOTRIN) 600 MG tablet     Sig: Take 1 tablet by mouth every 6 hours as needed for Pain     Dispense:  40 tablet     Refill:  1    oxyCODONE (ROXICODONE) 5 MG immediate release tablet     Sig: Take 1 tablet by mouth every 6 hours as needed for Pain for up to 3 days. Intended supply: 3 days.  Take lowest dose possible to manage pain     Dispense:  10 tablet     Refill:  0     Reduce doses taken as pain becomes manageable    ondansetron (ZOFRAN) 4 MG tablet     Sig: Take 1 tablet by mouth 3 times daily as needed for Nausea or Vomiting     Dispense:  15 tablet     Refill:  0       DDX: STI, PID, UTI, cystitis, nephrolithiasis, pyelonephritis, trauma, retained foreign body, ectopic, TOA were considered in the differential diagnosis. DIAGNOSTIC RESULTS / EMERGENCY DEPARTMENT COURSE / MDM   LAB RESULTS:  Results for orders placed or performed during the hospital encounter of 08/24/22   Culture, Blood 1    Specimen: Blood   Result Value Ref Range    Specimen Description . BLOOD     Special Requests LT ACUB 10ML     Culture NO GROWTH 12 HOURS    Culture, Blood 1    Specimen: Blood   Result Value Ref Range    Specimen Description . BLOOD     Special Requests RT ACUB 10ML     Culture NO GROWTH 12 HOURS    COVID-19, Rapid    Specimen: Nasopharyngeal Swab   Result Value Ref Range    Specimen Description . NASOPHARYNGEAL SWAB     SARS-CoV-2, Rapid DETECTED (A) Not Detected   CBC with Auto Differential   Result Value Ref Range    WBC 9.6 3.5 - 11.3 k/uL    RBC 4.46 3.95 - 5.11 m/uL    Hemoglobin 14.2 11.9 - 15.1 g/dL    Hematocrit 41.9 36.3 - 47.1 %    MCV 93.9 82.6 - 102.9 fL    MCH 31.8 25.2 - 33.5 pg    MCHC 33.9 28.4 - 34.8 g/dL    RDW 12.7 11.8 - 14.4 %    Platelets 754 819 - 639 k/uL    MPV 10.7 8.1 - 13.5 fL    NRBC Automated 0.0 0.0 per 100 WBC    Seg Neutrophils 69 (H) 36 - 65 %    Lymphocytes 22 (L) 24 - 43 %    Monocytes 7 3 - 12 %    Eosinophils % 1 1 - 4 %    Basophils 1 0 - 2 %    Immature Granulocytes 0 0 %    Segs Absolute 6.68 1.50 - 8.10 k/uL    Absolute Lymph # 2.09 1.10 - 3.70 k/uL    Absolute Mono # 0.69 0.10 - 1.20 k/uL    Absolute Eos # 0.06 0.00 - 0.44 k/uL    Basophils Absolute 0.05 0.00 - 0.20 k/uL    Absolute Immature Granulocyte 0.04 0.00 - 0.30 k/uL   Comprehensive Metabolic Panel   Result Value Ref Range    Glucose 124 (H) 70 - 99 mg/dL    BUN 7 6 - 20 mg/dL    Creatinine 0.81 0.50 - 0.90 mg/dL Calcium 8.8 8.6 - 10.4 mg/dL    Sodium 135 135 - 144 mmol/L    Potassium 3.9 3.7 - 5.3 mmol/L    Chloride 100 98 - 107 mmol/L    CO2 26 20 - 31 mmol/L    Anion Gap 9 9 - 17 mmol/L    Alkaline Phosphatase 68 35 - 104 U/L    ALT 18 5 - 33 U/L    AST 19 <32 U/L    Total Bilirubin 0.55 0.3 - 1.2 mg/dL    Total Protein 6.3 (L) 6.4 - 8.3 g/dL    Albumin 3.6 3.5 - 5.2 g/dL    Albumin/Globulin Ratio 1.3 1.0 - 2.5    GFR Non-African American >60 >60 mL/min    GFR African American >60 >60 mL/min    GFR Comment         Lactate, Sepsis   Result Value Ref Range    Lactic Acid, Sepsis, Whole Blood 1.2 0.5 - 1.9 mmol/L   Urinalysis with Microscopic   Result Value Ref Range    Color, UA Yellow Yellow    Turbidity UA Clear Clear    Glucose, Ur NEGATIVE NEGATIVE    Bilirubin Urine NEGATIVE NEGATIVE    Ketones, Urine NEGATIVE NEGATIVE    Specific Gravity, UA 1.019 1.005 - 1.030    Urine Hgb NEGATIVE NEGATIVE    pH, UA 5.5 5.0 - 8.0    Protein, UA NEGATIVE NEGATIVE    Urobilinogen, Urine Normal Normal    Nitrite, Urine NEGATIVE NEGATIVE    Leukocyte Esterase, Urine SMALL (A) NEGATIVE    WBC, UA 10 TO 20 0 - 5 /HPF    RBC, UA 0 TO 2 0 - 4 /HPF    Casts UA  0 - 8 /LPF     2 TO 5 HYALINE Reference range defined for non-centrifuged specimen.     Epithelial Cells UA 5 TO 10 0 - 5 /HPF   TSH with Reflex   Result Value Ref Range    TSH 0.90 0.30 - 5.00 uIU/mL   CBC with Auto Differential   Result Value Ref Range    WBC 6.7 3.5 - 11.3 k/uL    RBC 3.68 (L) 3.95 - 5.11 m/uL    Hemoglobin 11.7 (L) 11.9 - 15.1 g/dL    Hematocrit 34.6 (L) 36.3 - 47.1 %    MCV 94.0 82.6 - 102.9 fL    MCH 31.8 25.2 - 33.5 pg    MCHC 33.8 28.4 - 34.8 g/dL    RDW 12.7 11.8 - 14.4 %    Platelets 99 (L) 468 - 453 k/uL    MPV 10.5 8.1 - 13.5 fL    NRBC Automated 0.0 0.0 per 100 WBC    Seg Neutrophils 69 (H) 36 - 65 %    Lymphocytes 20 (L) 24 - 43 %    Monocytes 9 3 - 12 %    Eosinophils % 1 1 - 4 %    Basophils 0 0 - 2 %    Immature Granulocytes 1 (H) 0 %    Segs Discharge 08/25/2022 12:36:09 PM      PATIENT REFERRED TO:  No follow-up provider specified. DISCHARGE MEDICATIONS:  Discharge Medication List as of 8/25/2022 12:13 PM        START taking these medications    Details   doxycycline hyclate (VIBRA-TABS) 100 MG tablet Take 1 tablet by mouth 2 times daily for 14 days, Disp-28 tablet, R-0Print      metroNIDAZOLE (FLAGYL) 500 MG tablet Take 1 tablet by mouth 2 times daily for 14 days, Disp-28 tablet, R-0Print      ibuprofen (ADVIL;MOTRIN) 600 MG tablet Take 1 tablet by mouth every 6 hours as needed for Pain, Disp-40 tablet, R-1Print      oxyCODONE (ROXICODONE) 5 MG immediate release tablet Take 1 tablet by mouth every 6 hours as needed for Pain for up to 3 days. Intended supply: 3 days.  Take lowest dose possible to manage pain, Disp-10 tablet, R-0Print      ondansetron (ZOFRAN) 4 MG tablet Take 1 tablet by mouth 3 times daily as needed for Nausea or Vomiting, Disp-15 tablet, R-0Print             Chen French MD  Emergency Medicine Resident    (Please note that portions of thisnote were completed with a voice recognition program.  Efforts were made to edit the dictations but occasionally words are mis-transcribed.)      Chen French MD  Resident  08/25/22 1155 written material/verbal instruction

## 2022-10-11 NOTE — ED ADULT TRIAGE NOTE - AS TEMP SITE
oral Colchicine Counseling:  Patient counseled regarding adverse effects including but not limited to stomach upset (nausea, vomiting, stomach pain, or diarrhea).  Patient instructed to limit alcohol consumption while taking this medication.  Colchicine may reduce blood counts especially with prolonged use.  The patient understands that monitoring of kidney function and blood counts may be required, especially at baseline. The patient verbalized understanding of the proper use and possible adverse effects of colchicine.  All of the patient's questions and concerns were addressed.

## 2022-12-08 ENCOUNTER — OUTPATIENT (OUTPATIENT)
Dept: OUTPATIENT SERVICES | Facility: HOSPITAL | Age: 82
LOS: 1 days | Discharge: HOME | End: 2022-12-08

## 2022-12-08 VITALS — HEIGHT: 62 IN | WEIGHT: 169.09 LBS

## 2022-12-08 DIAGNOSIS — Z96.641 PRESENCE OF RIGHT ARTIFICIAL HIP JOINT: Chronic | ICD-10-CM

## 2022-12-08 DIAGNOSIS — Z98.49 CATARACT EXTRACTION STATUS, UNSPECIFIED EYE: Chronic | ICD-10-CM

## 2022-12-08 DIAGNOSIS — I20.0 UNSTABLE ANGINA: ICD-10-CM

## 2022-12-08 DIAGNOSIS — Z98.890 OTHER SPECIFIED POSTPROCEDURAL STATES: Chronic | ICD-10-CM

## 2022-12-08 LAB
ALBUMIN SERPL ELPH-MCNC: 4.4 G/DL — SIGNIFICANT CHANGE UP (ref 3.3–5)
ALP SERPL-CCNC: 93 U/L — SIGNIFICANT CHANGE UP (ref 40–120)
ALT FLD-CCNC: 23 U/L — SIGNIFICANT CHANGE UP (ref 4–33)
ANION GAP SERPL CALC-SCNC: 11 MMOL/L — SIGNIFICANT CHANGE UP (ref 7–14)
AST SERPL-CCNC: 21 U/L — SIGNIFICANT CHANGE UP (ref 4–32)
BILIRUB SERPL-MCNC: 1.5 MG/DL — HIGH (ref 0.2–1.2)
BUN SERPL-MCNC: 15 MG/DL — SIGNIFICANT CHANGE UP (ref 7–23)
CALCIUM SERPL-MCNC: 9.5 MG/DL — SIGNIFICANT CHANGE UP (ref 8.4–10.5)
CHLORIDE SERPL-SCNC: 103 MMOL/L — SIGNIFICANT CHANGE UP (ref 98–107)
CO2 SERPL-SCNC: 26 MMOL/L — SIGNIFICANT CHANGE UP (ref 22–31)
CREAT SERPL-MCNC: 0.61 MG/DL — SIGNIFICANT CHANGE UP (ref 0.5–1.3)
EGFR: 90 ML/MIN/1.73M2 — SIGNIFICANT CHANGE UP
GLUCOSE SERPL-MCNC: 101 MG/DL — HIGH (ref 70–99)
HCT VFR BLD CALC: 44.2 % — SIGNIFICANT CHANGE UP (ref 34.5–45)
HGB BLD-MCNC: 14.8 G/DL — SIGNIFICANT CHANGE UP (ref 11.5–15.5)
MAGNESIUM SERPL-MCNC: 2.1 MG/DL — SIGNIFICANT CHANGE UP (ref 1.6–2.6)
MCHC RBC-ENTMCNC: 28.8 PG — SIGNIFICANT CHANGE UP (ref 27–34)
MCHC RBC-ENTMCNC: 33.5 GM/DL — SIGNIFICANT CHANGE UP (ref 32–36)
MCV RBC AUTO: 86.2 FL — SIGNIFICANT CHANGE UP (ref 80–100)
NRBC # BLD: 0 /100 WBCS — SIGNIFICANT CHANGE UP (ref 0–0)
NRBC # FLD: 0 K/UL — SIGNIFICANT CHANGE UP (ref 0–0)
PHOSPHATE SERPL-MCNC: 3.6 MG/DL — SIGNIFICANT CHANGE UP (ref 2.5–4.5)
PLATELET # BLD AUTO: 210 K/UL — SIGNIFICANT CHANGE UP (ref 150–400)
POTASSIUM SERPL-MCNC: 3.8 MMOL/L — SIGNIFICANT CHANGE UP (ref 3.5–5.3)
POTASSIUM SERPL-SCNC: 3.8 MMOL/L — SIGNIFICANT CHANGE UP (ref 3.5–5.3)
PROT SERPL-MCNC: 7.5 G/DL — SIGNIFICANT CHANGE UP (ref 6–8.3)
RBC # BLD: 5.13 M/UL — SIGNIFICANT CHANGE UP (ref 3.8–5.2)
RBC # FLD: 13.1 % — SIGNIFICANT CHANGE UP (ref 10.3–14.5)
SODIUM SERPL-SCNC: 140 MMOL/L — SIGNIFICANT CHANGE UP (ref 135–145)
WBC # BLD: 5.5 K/UL — SIGNIFICANT CHANGE UP (ref 3.8–10.5)
WBC # FLD AUTO: 5.5 K/UL — SIGNIFICANT CHANGE UP (ref 3.8–10.5)

## 2022-12-08 PROCEDURE — 93010 ELECTROCARDIOGRAM REPORT: CPT

## 2022-12-08 RX ORDER — SODIUM CHLORIDE 9 MG/ML
3 INJECTION INTRAMUSCULAR; INTRAVENOUS; SUBCUTANEOUS EVERY 8 HOURS
Refills: 0 | Status: DISCONTINUED | OUTPATIENT
Start: 2022-12-08 | End: 2022-12-22

## 2022-12-08 RX ORDER — BUDESONIDE AND FORMOTEROL FUMARATE DIHYDRATE 160; 4.5 UG/1; UG/1
2 AEROSOL RESPIRATORY (INHALATION)
Qty: 0 | Refills: 0 | DISCHARGE

## 2022-12-08 RX ORDER — AMLODIPINE BESYLATE 2.5 MG/1
1 TABLET ORAL
Qty: 0 | Refills: 0 | DISCHARGE

## 2022-12-08 RX ORDER — HYDROCHLOROTHIAZIDE 25 MG
1 TABLET ORAL
Qty: 0 | Refills: 0 | DISCHARGE

## 2022-12-08 RX ORDER — SODIUM CHLORIDE 9 MG/ML
500 INJECTION INTRAMUSCULAR; INTRAVENOUS; SUBCUTANEOUS
Refills: 0 | Status: DISCONTINUED | OUTPATIENT
Start: 2022-12-08 | End: 2022-12-22

## 2022-12-08 RX ORDER — LEVOTHYROXINE SODIUM 125 MCG
1 TABLET ORAL
Qty: 0 | Refills: 0 | DISCHARGE

## 2022-12-08 RX ORDER — SODIUM CHLORIDE 9 MG/ML
250 INJECTION INTRAMUSCULAR; INTRAVENOUS; SUBCUTANEOUS ONCE
Refills: 0 | Status: COMPLETED | OUTPATIENT
Start: 2022-12-08 | End: 2022-12-08

## 2022-12-08 RX ORDER — ALBUTEROL 90 UG/1
3 AEROSOL, METERED ORAL
Qty: 0 | Refills: 0 | DISCHARGE

## 2022-12-08 RX ORDER — ASPIRIN/CALCIUM CARB/MAGNESIUM 324 MG
1 TABLET ORAL
Qty: 0 | Refills: 0 | DISCHARGE

## 2022-12-08 RX ORDER — PANTOPRAZOLE SODIUM 20 MG/1
1 TABLET, DELAYED RELEASE ORAL
Qty: 30 | Refills: 0 | DISCHARGE

## 2022-12-08 RX ORDER — ROSUVASTATIN CALCIUM 5 MG/1
1 TABLET ORAL
Qty: 0 | Refills: 0 | DISCHARGE

## 2022-12-08 RX ADMIN — SODIUM CHLORIDE 750 MILLILITER(S): 9 INJECTION INTRAMUSCULAR; INTRAVENOUS; SUBCUTANEOUS at 08:30

## 2022-12-08 RX ADMIN — SODIUM CHLORIDE 75 MILLILITER(S): 9 INJECTION INTRAMUSCULAR; INTRAVENOUS; SUBCUTANEOUS at 09:16

## 2022-12-08 RX ADMIN — SODIUM CHLORIDE 200 MILLILITER(S): 9 INJECTION INTRAMUSCULAR; INTRAVENOUS; SUBCUTANEOUS at 10:39

## 2022-12-08 NOTE — H&P CARDIOLOGY - NSICDXPASTMEDICALHX_GEN_ALL_CORE_FT
PAST MEDICAL HISTORY:  Asthma     Cervical spinal stenosis     GERD (gastroesophageal reflux disease)     HLD (hyperlipidemia)     HTN (hypertension)     Hypothyroidism     Myocardial bridge

## 2022-12-08 NOTE — H&P CARDIOLOGY - EKG AND INTERPRETATION
Sinus bradycardia at 53 bpm, TWI V2-V6, Q wave AVL I have personally provided the amount of critical care time documented below concurrently with the resident/fellow.  This time excludes time spent on separate procedures and time spent teaching. I have reviewed the resident’s / fellow’s documentation and I agree with the history, exam, and assessment and plan of care.

## 2022-12-08 NOTE — H&P CARDIOLOGY - HISTORY OF PRESENT ILLNESS
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 Pt is primarily Japanese speaking but was able to communicate in English that she would like her daughter Purvi Aaron at bedside to assist in translation. On November 1, while walking in [place], pt had an episode of 8/10, non-pleuritic, non-radiating, left sided chest pain, which was associated with shortness of breath, dizziness, fatigue and palpitations. Pt describes the chest pain as a "sharp" pain which occurred while exerting herself. Pt says she felt like she was going to pass out because she became so dizzy. Pt sat down to rest and within 10 minutes, all of her symptoms subsided. Pt had one further episode that was similar, but not as severe, which occurred on November 10, also when she was exerting herself. Pt has not had any further episodes. Pt followed up with her cardiologist, Dr. Mcguire, who recommended cardiac catheterization. Of note, pt had a cardiac catheterization in 2019 which showed normal coronary arteries with a LAD myocardial bridge. Pt denies fever, chills, recent travel, headache, visual deficits, orthopnea, abdominal pain, N/V/D/C, hematochezia, melena, dysuria, hematuria, LOC, syncope, peripheral edema.     Cardiologist: Dr. Jan Mcguire  COVID status: PCR negative 12/5/2022

## 2022-12-08 NOTE — H&P CARDIOLOGY - WEIGHT IN LBS
169 Complex Repair And Dermal Autograft Text: The defect edges were debeveled with a #15 scalpel blade.  The primary defect was closed partially with a complex linear closure.  Given the location of the defect, shape of the defect and the proximity to free margins an dermal autograft was deemed most appropriate to repair the remaining defect.  The graft was trimmed to fit the size of the remaining defect.  The graft was then placed in the primary defect, oriented appropriately, and sutured into place.

## 2025-02-12 ENCOUNTER — EMERGENCY (EMERGENCY)
Facility: HOSPITAL | Age: 85
LOS: 1 days | Discharge: HOME | End: 2025-02-12
Attending: EMERGENCY MEDICINE | Admitting: EMERGENCY MEDICINE
Payer: MEDICARE

## 2025-02-12 VITALS
WEIGHT: 169.98 LBS | TEMPERATURE: 98 F | SYSTOLIC BLOOD PRESSURE: 127 MMHG | RESPIRATION RATE: 16 BRPM | OXYGEN SATURATION: 96 % | HEART RATE: 61 BPM | DIASTOLIC BLOOD PRESSURE: 65 MMHG

## 2025-02-12 DIAGNOSIS — Z98.49 CATARACT EXTRACTION STATUS, UNSPECIFIED EYE: Chronic | ICD-10-CM

## 2025-02-12 DIAGNOSIS — Z96.641 PRESENCE OF RIGHT ARTIFICIAL HIP JOINT: Chronic | ICD-10-CM

## 2025-02-12 PROBLEM — J45.909 UNSPECIFIED ASTHMA, UNCOMPLICATED: Chronic | Status: ACTIVE | Noted: 2022-12-08

## 2025-02-12 PROBLEM — E78.5 HYPERLIPIDEMIA, UNSPECIFIED: Chronic | Status: ACTIVE | Noted: 2022-12-08

## 2025-02-12 PROBLEM — Q24.5 MALFORMATION OF CORONARY VESSELS: Chronic | Status: ACTIVE | Noted: 2022-12-08

## 2025-02-12 PROBLEM — E03.9 HYPOTHYROIDISM, UNSPECIFIED: Chronic | Status: ACTIVE | Noted: 2022-12-08

## 2025-02-12 PROBLEM — K21.9 GASTRO-ESOPHAGEAL REFLUX DISEASE WITHOUT ESOPHAGITIS: Chronic | Status: ACTIVE | Noted: 2022-12-08

## 2025-02-12 LAB
ALBUMIN SERPL ELPH-MCNC: 4.1 G/DL — SIGNIFICANT CHANGE UP (ref 3.3–5)
ALP SERPL-CCNC: 84 U/L — SIGNIFICANT CHANGE UP (ref 40–120)
ALT FLD-CCNC: 14 U/L — SIGNIFICANT CHANGE UP (ref 4–33)
ANION GAP SERPL CALC-SCNC: 13 MMOL/L — SIGNIFICANT CHANGE UP (ref 7–14)
AST SERPL-CCNC: 18 U/L — SIGNIFICANT CHANGE UP (ref 4–32)
BASOPHILS # BLD AUTO: 0.06 K/UL — SIGNIFICANT CHANGE UP (ref 0–0.2)
BASOPHILS NFR BLD AUTO: 0.8 % — SIGNIFICANT CHANGE UP (ref 0–2)
BILIRUB SERPL-MCNC: 1.3 MG/DL — HIGH (ref 0.2–1.2)
BUN SERPL-MCNC: 20 MG/DL — SIGNIFICANT CHANGE UP (ref 7–23)
CALCIUM SERPL-MCNC: 9.2 MG/DL — SIGNIFICANT CHANGE UP (ref 8.4–10.5)
CHLORIDE SERPL-SCNC: 101 MMOL/L — SIGNIFICANT CHANGE UP (ref 98–107)
CO2 SERPL-SCNC: 25 MMOL/L — SIGNIFICANT CHANGE UP (ref 22–31)
CREAT SERPL-MCNC: 0.53 MG/DL — SIGNIFICANT CHANGE UP (ref 0.5–1.3)
EGFR: 92 ML/MIN/1.73M2 — SIGNIFICANT CHANGE UP
EGFR: 92 ML/MIN/1.73M2 — SIGNIFICANT CHANGE UP
EOSINOPHIL # BLD AUTO: 0.11 K/UL — SIGNIFICANT CHANGE UP (ref 0–0.5)
EOSINOPHIL NFR BLD AUTO: 1.5 % — SIGNIFICANT CHANGE UP (ref 0–6)
GLUCOSE SERPL-MCNC: 83 MG/DL — SIGNIFICANT CHANGE UP (ref 70–99)
HCT VFR BLD CALC: 42.3 % — SIGNIFICANT CHANGE UP (ref 34.5–45)
HGB BLD-MCNC: 14.7 G/DL — SIGNIFICANT CHANGE UP (ref 11.5–15.5)
IANC: 3.49 K/UL — SIGNIFICANT CHANGE UP (ref 1.8–7.4)
IMM GRANULOCYTES NFR BLD AUTO: 0.3 % — SIGNIFICANT CHANGE UP (ref 0–0.9)
LYMPHOCYTES # BLD AUTO: 2.92 K/UL — SIGNIFICANT CHANGE UP (ref 1–3.3)
LYMPHOCYTES # BLD AUTO: 39.9 % — SIGNIFICANT CHANGE UP (ref 13–44)
MCHC RBC-ENTMCNC: 29.4 PG — SIGNIFICANT CHANGE UP (ref 27–34)
MCHC RBC-ENTMCNC: 34.8 G/DL — SIGNIFICANT CHANGE UP (ref 32–36)
MCV RBC AUTO: 84.6 FL — SIGNIFICANT CHANGE UP (ref 80–100)
MONOCYTES # BLD AUTO: 0.71 K/UL — SIGNIFICANT CHANGE UP (ref 0–0.9)
MONOCYTES NFR BLD AUTO: 9.7 % — SIGNIFICANT CHANGE UP (ref 2–14)
NEUTROPHILS # BLD AUTO: 3.49 K/UL — SIGNIFICANT CHANGE UP (ref 1.8–7.4)
NEUTROPHILS NFR BLD AUTO: 47.8 % — SIGNIFICANT CHANGE UP (ref 43–77)
NRBC # BLD AUTO: 0 K/UL — SIGNIFICANT CHANGE UP (ref 0–0)
NRBC # FLD: 0 K/UL — SIGNIFICANT CHANGE UP (ref 0–0)
NRBC BLD AUTO-RTO: 0 /100 WBCS — SIGNIFICANT CHANGE UP (ref 0–0)
PLATELET # BLD AUTO: 216 K/UL — SIGNIFICANT CHANGE UP (ref 150–400)
POTASSIUM SERPL-MCNC: 3.7 MMOL/L — SIGNIFICANT CHANGE UP (ref 3.5–5.3)
POTASSIUM SERPL-SCNC: 3.7 MMOL/L — SIGNIFICANT CHANGE UP (ref 3.5–5.3)
PROT SERPL-MCNC: 7.1 G/DL — SIGNIFICANT CHANGE UP (ref 6–8.3)
RBC # BLD: 5 M/UL — SIGNIFICANT CHANGE UP (ref 3.8–5.2)
RBC # FLD: 12.9 % — SIGNIFICANT CHANGE UP (ref 10.3–14.5)
SODIUM SERPL-SCNC: 139 MMOL/L — SIGNIFICANT CHANGE UP (ref 135–145)
TROPONIN T, HIGH SENSITIVITY RESULT: 9 NG/L — SIGNIFICANT CHANGE UP
WBC # BLD: 7.31 K/UL — SIGNIFICANT CHANGE UP (ref 3.8–10.5)
WBC # FLD AUTO: 7.31 K/UL — SIGNIFICANT CHANGE UP (ref 3.8–10.5)

## 2025-02-12 PROCEDURE — 71045 X-RAY EXAM CHEST 1 VIEW: CPT | Mod: 26

## 2025-02-12 PROCEDURE — 99285 EMERGENCY DEPT VISIT HI MDM: CPT

## 2025-02-12 PROCEDURE — 93010 ELECTROCARDIOGRAM REPORT: CPT

## 2025-02-12 RX ORDER — ASPIRIN 325 MG
162 TABLET ORAL ONCE
Refills: 0 | Status: COMPLETED | OUTPATIENT
Start: 2025-02-12 | End: 2025-02-12

## 2025-02-12 RX ORDER — ACETAMINOPHEN 500 MG/5ML
975 LIQUID (ML) ORAL ONCE
Refills: 0 | Status: COMPLETED | OUTPATIENT
Start: 2025-02-12 | End: 2025-02-12

## 2025-02-12 RX ADMIN — Medication 975 MILLIGRAM(S): at 20:10

## 2025-04-25 ENCOUNTER — APPOINTMENT (OUTPATIENT)
Dept: HOME HEALTH SERVICES | Facility: HOME HEALTH | Age: 85
End: 2025-04-25
Payer: MEDICARE

## 2025-04-25 VITALS — RESPIRATION RATE: 17 BRPM | TEMPERATURE: 98.1 F | OXYGEN SATURATION: 97 % | HEART RATE: 60 BPM

## 2025-04-25 DIAGNOSIS — E03.9 HYPOTHYROIDISM, UNSPECIFIED: ICD-10-CM

## 2025-04-25 DIAGNOSIS — G47.33 OBSTRUCTIVE SLEEP APNEA (ADULT) (PEDIATRIC): ICD-10-CM

## 2025-04-25 DIAGNOSIS — G56.03 CARPAL TUNNEL SYNDROM,BILATERAL UPPER LIMBS: ICD-10-CM

## 2025-04-25 DIAGNOSIS — J45.20 MILD INTERMITTENT ASTHMA, UNCOMPLICATED: ICD-10-CM

## 2025-04-25 DIAGNOSIS — K21.9 GASTRO-ESOPHAGEAL REFLUX DISEASE W/OUT ESOPHAGITIS: ICD-10-CM

## 2025-04-25 DIAGNOSIS — Z71.89 OTHER SPECIFIED COUNSELING: ICD-10-CM

## 2025-04-25 DIAGNOSIS — E78.5 HYPERLIPIDEMIA, UNSPECIFIED: ICD-10-CM

## 2025-04-25 DIAGNOSIS — Q24.5 MALFORMATION OF CORONARY VESSELS: ICD-10-CM

## 2025-04-25 DIAGNOSIS — I10 ESSENTIAL (PRIMARY) HYPERTENSION: ICD-10-CM

## 2025-04-25 PROCEDURE — 99497 ADVNCD CARE PLAN 30 MIN: CPT | Mod: 2W

## 2025-04-25 PROCEDURE — 99344 HOME/RES VST NEW MOD MDM 60: CPT | Mod: 25,2W

## 2025-04-25 PROCEDURE — G0506: CPT | Mod: 2W

## 2025-04-25 RX ORDER — HYDROCHLOROTHIAZIDE 12.5 MG/1
12.5 TABLET ORAL DAILY
Qty: 90 | Refills: 3 | Status: ACTIVE | COMMUNITY
Start: 2025-04-25

## 2025-04-25 RX ORDER — IBUPROFEN 200 MG/1
200 TABLET, FILM COATED ORAL
Refills: 0 | Status: ACTIVE | COMMUNITY
Start: 2025-04-25

## 2025-04-25 RX ORDER — ACETAMINOPHEN ER 650 MG TABLET,EXTENDED RELEASE 650 MG
650 TABLET, EXTENDED RELEASE ORAL
Refills: 0 | Status: ACTIVE | COMMUNITY
Start: 2025-04-25

## 2025-04-25 RX ORDER — LEVOTHYROXINE SODIUM 0.07 MG/1
75 TABLET ORAL DAILY
Qty: 90 | Refills: 3 | Status: ACTIVE | COMMUNITY
Start: 2025-04-25

## 2025-04-25 RX ORDER — PANTOPRAZOLE 40 MG/1
40 TABLET, DELAYED RELEASE ORAL DAILY
Qty: 90 | Refills: 1 | Status: ACTIVE | COMMUNITY
Start: 2025-04-25

## 2025-04-25 RX ORDER — CHLORHEXIDINE GLUCONATE 4 %
1000 LIQUID (ML) TOPICAL DAILY
Refills: 0 | Status: ACTIVE | COMMUNITY
Start: 2025-04-25

## 2025-04-25 RX ORDER — FLUTICASONE PROPIONATE 50 UG/1
50 SPRAY, METERED NASAL
Refills: 0 | Status: ACTIVE | COMMUNITY
Start: 2025-04-25

## 2025-04-25 RX ORDER — ROSUVASTATIN CALCIUM 40 MG/1
40 TABLET, FILM COATED ORAL
Qty: 90 | Refills: 0 | Status: ACTIVE | COMMUNITY
Start: 2025-04-25

## 2025-04-25 RX ORDER — ALBUTEROL SULFATE 2.5 MG/3ML
(2.5 MG/3ML) SOLUTION RESPIRATORY (INHALATION)
Refills: 0 | Status: ACTIVE | COMMUNITY
Start: 2025-04-25

## 2025-04-25 RX ORDER — ALBUTEROL SULFATE 90 UG/1
108 (90 BASE) INHALANT RESPIRATORY (INHALATION)
Refills: 0 | Status: ACTIVE | COMMUNITY
Start: 2025-04-25

## 2025-04-25 RX ORDER — AMLODIPINE BESYLATE 5 MG/1
5 TABLET ORAL DAILY
Qty: 90 | Refills: 2 | Status: ACTIVE | COMMUNITY
Start: 2025-04-25

## 2025-04-25 RX ORDER — DICLOFENAC SODIUM 1 %
1 KIT TOPICAL DAILY
Refills: 0 | Status: ACTIVE | COMMUNITY
Start: 2025-04-25

## 2025-04-25 RX ORDER — ASPIRIN 81 MG/1
81 TABLET, DELAYED RELEASE ORAL
Refills: 0 | Status: ACTIVE | COMMUNITY
Start: 2025-04-25

## 2025-04-25 RX ORDER — MONTELUKAST 10 MG/1
10 TABLET, FILM COATED ORAL DAILY
Qty: 30 | Refills: 3 | Status: ACTIVE | COMMUNITY
Start: 2025-04-25

## 2025-04-26 PROBLEM — E78.5 HLD (HYPERLIPIDEMIA): Status: ACTIVE | Noted: 2025-04-25

## 2025-04-26 PROBLEM — E03.9 HYPOTHYROIDISM, UNSPECIFIED TYPE: Status: ACTIVE | Noted: 2025-04-25

## 2025-04-26 PROBLEM — Z71.89 ADVANCED CARE PLANNING/COUNSELING DISCUSSION: Status: ACTIVE | Noted: 2025-04-25

## 2025-04-30 LAB
ALBUMIN SERPL ELPH-MCNC: 4.4 G/DL
ALP BLD-CCNC: 83 U/L
ALT SERPL-CCNC: 17 U/L
ANION GAP SERPL CALC-SCNC: 18 MMOL/L
AST SERPL-CCNC: 22 U/L
BILIRUB SERPL-MCNC: 1.1 MG/DL
BUN SERPL-MCNC: 17 MG/DL
CALCIUM SERPL-MCNC: 9.7 MG/DL
CHLORIDE SERPL-SCNC: 99 MMOL/L
CHOLEST SERPL-MCNC: 170 MG/DL
CO2 SERPL-SCNC: 22 MMOL/L
CREAT SERPL-MCNC: 0.62 MG/DL
EGFRCR SERPLBLD CKD-EPI 2021: 89 ML/MIN/1.73M2
GLUCOSE SERPL-MCNC: 91 MG/DL
HCT VFR BLD CALC: 43.7 %
HDLC SERPL-MCNC: 63 MG/DL
HGB BLD-MCNC: 14.2 G/DL
LDLC SERPL-MCNC: 81 MG/DL
MCHC RBC-ENTMCNC: 29.1 PG
MCHC RBC-ENTMCNC: 32.5 G/DL
MCV RBC AUTO: 89.5 FL
NONHDLC SERPL-MCNC: 107 MG/DL
PLATELET # BLD AUTO: 229 K/UL
POTASSIUM SERPL-SCNC: 3.6 MMOL/L
PROT SERPL-MCNC: 6.8 G/DL
RBC # BLD: 4.88 M/UL
RBC # FLD: 13.5 %
SODIUM SERPL-SCNC: 139 MMOL/L
TRIGL SERPL-MCNC: 149 MG/DL
TSH SERPL-ACNC: 1.02 UIU/ML
WBC # FLD AUTO: 6.58 K/UL